# Patient Record
Sex: FEMALE | Race: WHITE | NOT HISPANIC OR LATINO | Employment: OTHER | URBAN - NONMETROPOLITAN AREA
[De-identification: names, ages, dates, MRNs, and addresses within clinical notes are randomized per-mention and may not be internally consistent; named-entity substitution may affect disease eponyms.]

---

## 2017-01-10 ENCOUNTER — OFFICE VISIT (OUTPATIENT)
Dept: UROLOGY | Facility: CLINIC | Age: 47
End: 2017-01-10

## 2017-01-10 DIAGNOSIS — N30.10 INTERSTITIAL CYSTITIS: Primary | ICD-10-CM

## 2017-01-10 LAB
BILIRUB BLD-MCNC: NEGATIVE MG/DL
CLARITY, POC: CLEAR
COLOR UR: YELLOW
GLUCOSE UR STRIP-MCNC: NEGATIVE MG/DL
KETONES UR QL: NEGATIVE
LEUKOCYTE EST, POC: NEGATIVE
NITRITE UR-MCNC: NEGATIVE MG/ML
PH UR: 5.5 [PH] (ref 5–8)
PROT UR STRIP-MCNC: NEGATIVE MG/DL
RBC # UR STRIP: NEGATIVE /UL
SP GR UR: 1.02 (ref 1–1.03)
UROBILINOGEN UR QL: NORMAL

## 2017-01-10 PROCEDURE — 81003 URINALYSIS AUTO W/O SCOPE: CPT | Performed by: NURSE PRACTITIONER

## 2017-01-10 PROCEDURE — 51700 IRRIGATION OF BLADDER: CPT | Performed by: NURSE PRACTITIONER

## 2017-01-10 NOTE — MR AVS SNAPSHOT
Sangeeta Aguilar   1/10/2017 1:20 PM   Office Visit    Dept Phone:  [phone]   Encounter #:  [id number]    Provider:  YURIY Hodge   Department:  [facility] UROLOGY                Your Full Care Plan              Your Updated Medication List          This list is accurate as of: 1/10/17  2:51 PM.  Always use your most recent med list.                atenolol 25 MG tablet   Commonly known as:  TENORMIN       busPIRone 30 MG tablet   Commonly known as:  BUSPAR   Take 1 tablet by mouth 2 (Two) Times a Day.       gabapentin 600 MG tablet   Commonly known as:  NEURONTIN       HYDROcodone-acetaminophen  MG per tablet   Commonly known as:  NORCO       levocetirizine 5 MG tablet   Commonly known as:  XYZAL       methotrexate 2.5 MG tablet       omeprazole 40 MG capsule   Commonly known as:  priLOSEC       raNITIdine 300 MG tablet   Commonly known as:  ZANTAC       venlafaxine  MG 24 hr capsule   Commonly known as:  EFFEXOR XR   Take 2 capsules by mouth daily               We Performed the Following     POC Urinalysis Dipstick, Automated       You Were Diagnosed With        Codes Comments    Interstitial cystitis    -  Primary ICD-10-CM: N30.10  ICD-9-CM: 595.1       Instructions     None    Patient Instructions History      Upcoming Appointments     Visit Type Date Time Department    PSYCHOTHERAPY 1/10/2017  2:00 PM [facility]    FOLLOW UP 1/10/2017  1:20 PM [facility]    PSYCHOTHERAPY 1/24/2017  2:00 PM [facility]    PSYCHOTHERAPY 2/7/2017  2:00 PM [facility]    FOLLOW UP 2/10/2017  1:00 PM [facility]    PSYCHOTHERAPY 2/21/2017  2:00 PM [facility]    PSYCHOTHERAPY 3/7/2017  2:00 PM [facility]    PSYCHOTHERAPY 3/21/2017  2:00 PM [facility]    MEDICINE CHECK 4/5/2017  3:00 PM [facility]      MyChart Signup     [facility] MyChart allows you to send messages to your doctor, view your test results,  renew your prescriptions, schedule appointments, and more. To sign up, go to [email or web address] and click on the Sign Up Now link in the New User? box. Enter your [employer] Activation Code exactly as it appears below along with the last four digits of your Social Security Number and your Date of Birth () to complete the sign-up process. If you do not sign up before the expiration date, you must request a new code.    [employer] Activation Code: [email or web address]  Expires: 2017  3:41 PM    If you have questions, you can email [email or web address] or call [phone] to talk to our [employer] staff. Remember, [employer] is NOT to be used for urgent needs. For medical emergencies, dial 911.               Other Info from Your Visit           Your Appointments     2017  2:00 PM EST   Psychotherapy with Jovany Ellis [facility] BEHAVIORAL HEALTH (--)    [address]  [place] [address]   [phone]            2017  2:00 PM EST   Psychotherapy with Jovany Ellis [facility] BEHAVIORAL HEALTH (--)    [address]  [place] [address]   [phone]            Feb 10, 2017  1:00 PM EST   Follow Up with YURIY Hodge   [facility] UROLOGY (--)    [address]  [place] [address] [phone]           Arrive 15 minutes prior to appointment.            2017  2:00 PM EST   [facility] BE[facility]AL [facility] (--)    [address]  [place] [address]   [phone]            Mar 07, 2017  2:00 PM EST   Psychotherapy with Jovany Ellis [facility] BEHAVIORAL HEALTH (--)    [address]  [place] [address]   [phone]              Allergies     Aspirin      Biaxin [Clarithromycin]      Celexa [Citalopram]      Ciprofloxacin      Flexeril [Cyclobenzaprine]      Lexapro [Escitalopram]      Morphine And Related      Motrin [Ibuprofen]       Neuromuscular Blocking Agents      Penicillins      Prozac [Fluoxetine Hcl]      Sulfa Antibiotics      Thorazine [Chlorpromazine]      Unasyn [Ampicillin-sulbactam Sodium]      Zoloft [Sertraline Hcl]        Reason for Visit     Cystitis           Vital Signs     Smoking Status                   Never Smoker           Problems and Diagnoses Noted     Interstitial cystitis    -  Primary      Results     POC Urinalysis Dipstick, Automated      Component Value Standard Range & Units    Color Yellow Yellow, Straw, Dark Yellow, Luiza    Clarity, UA Clear Clear    Glucose, UA Negative Negative, 1000 mg/dL (3+) mg/dL    Bilirubin Negative Negative    Ketones, UA Negative Negative    Specific Gravity  1.020 1.005 - 1.030    Blood, UA Negative Negative    pH, Urine 5.5 5.0 - 8.0    Protein, POC Negative Negative mg/dL    Urobilinogen, UA Normal Normal    Leukocytes Negative Negative    Nitrite, UA Negative Negative

## 2017-01-10 NOTE — PROGRESS NOTES
Chief Complaint:          Chief Complaint   Patient presents with   • Cystitis       HPI:   46 y.o. female being seen today for history of interstitial cystitis and for bladder instillation.    HPI        Past Medical History:        Past Medical History   Diagnosis Date   • Acid reflux    • Anxiety    • Depression    • Fibromyalgia    • Panic attack    • Rapid heart beat          Current Meds:     Current Outpatient Prescriptions   Medication Sig Dispense Refill   • atenolol (TENORMIN) 25 MG tablet      • busPIRone (BUSPAR) 30 MG tablet Take 1 tablet by mouth 2 (Two) Times a Day. 60 tablet 2   • gabapentin (NEURONTIN) 600 MG tablet      • HYDROcodone-acetaminophen (NORCO)  MG per tablet      • levocetirizine (XYZAL) 5 MG tablet      • methotrexate 2.5 MG tablet      • omeprazole (priLOSEC) 40 MG capsule      • raNITIdine (ZANTAC) 300 MG tablet      • venlafaxine XR (EFFEXOR XR) 150 MG 24 hr capsule Take 2 capsules by mouth daily 60 capsule 2     No current facility-administered medications for this visit.         Allergies:      Allergies   Allergen Reactions   • Aspirin    • Biaxin [Clarithromycin]    • Celexa [Citalopram]    • Ciprofloxacin    • Flexeril [Cyclobenzaprine]    • Lexapro [Escitalopram]    • Morphine And Related    • Motrin [Ibuprofen]    • Neuromuscular Blocking Agents    • Penicillins    • Prozac [Fluoxetine Hcl]    • Sulfa Antibiotics    • Thorazine [Chlorpromazine]    • Unasyn [Ampicillin-Sulbactam Sodium]    • Zoloft [Sertraline Hcl]         Past Surgical History:     Past Surgical History   Procedure Laterality Date   • Tubal abdominal ligation     • Cholecystectomy           Social History:     Social History     Social History   • Marital status: Single     Spouse name: N/A   • Number of children: N/A   • Years of education: N/A     Occupational History   • Not on file.     Social History Main Topics   • Smoking status: Never Smoker   • Smokeless tobacco: Never Used   • Alcohol use No    • Drug use: No   • Sexual activity: Not on file     Other Topics Concern   • Not on file     Social History Narrative   • No narrative on file       Family History:     Family History   Problem Relation Age of Onset   • Heart disease Father        Review of Systems:     Review of Systems   Constitutional: Negative for chills, fatigue and fever.   Respiratory: Negative for cough, shortness of breath and wheezing.    Cardiovascular: Negative for leg swelling.   Gastrointestinal: Negative for abdominal pain, nausea and vomiting.   Musculoskeletal: Negative for back pain and joint swelling.   Neurological: Negative for dizziness and headaches.   Psychiatric/Behavioral: Negative for confusion.       Physical Exam:     Physical Exam   Genitourinary: Vagina normal and uterus normal.   Psychiatric: She has a normal mood and affect. Her behavior is normal. Judgment and thought content normal.       Procedure:     No notes on file      Assessment:     Encounter Diagnosis   Name Primary?   • Cystitis Yes     Orders Placed This Encounter   Procedures   • POC Urinalysis Dipstick, Automated       Plan:   sindy was given a bladder instillation of lidocaine 2%-10 mL and heparin 40,000 units with sterile procedure.  She is to return to the office in one month for her next bladder instillation.    Counseling was given to patient for the following topics diagnostic results, patient and family education, impressions and risks and benefits of treatment options. and the interim medical history and current results were reviewed.  A treatment plan with follow-up was made. Total time of the encounter was 5 minutes and 5 minutes were spent discussing Cystitis [N30.90] face-to-face.       This document has been electronically signed by YURIY Garcia January 10, 2017 2:40 PM

## 2017-01-24 ENCOUNTER — OFFICE VISIT (OUTPATIENT)
Dept: PSYCHIATRY | Facility: CLINIC | Age: 47
End: 2017-01-24

## 2017-01-24 DIAGNOSIS — F33.2 SEVERE EPISODE OF RECURRENT MAJOR DEPRESSIVE DISORDER, WITHOUT PSYCHOTIC FEATURES (HCC): Primary | ICD-10-CM

## 2017-01-24 DIAGNOSIS — F41.1 GENERALIZED ANXIETY DISORDER: ICD-10-CM

## 2017-01-24 PROCEDURE — 90832 PSYTX W PT 30 MINUTES: CPT | Performed by: SOCIAL WORKER

## 2017-01-24 NOTE — PROGRESS NOTES
[place], [place] : 1970  MULTIDISCIPLINARY PROGRESS NOTE    Date of Service: 2017  Time In: 2:00 PM        Time Out: 2:30 PM    DATA: Patient met 1:1 with Jovany Ellis LCSW, LCADC  for scheduled individual outpatient psychotherapy session at the [facility] for follow-up.  Patient reports she continues to live in the home with her son, her son's partner, and his mother.  She states she likes be in there and reports the homeowner has told her on multiple occasions she never wants her to leave.  Patient reports she continues to provide care for her grandchildren from time to time and states her son continues to be unemployed and continues in a Suboxone clinic in [place].  Patient reports she has experienced increased pain due to her arthritis and states she recently was forced to discontinue methotrexate due to it causing severe sores on her skin.  As a result, she reports decreased sleep and appetite.  Patient reports she continues to worry about her son and daughter-in-law's ability to appropriately care for her grandchildren as she feels they should.    HPI: Patient reports ongoing symptoms including sad mood, periodic feelings of hopelessness, anergia, excessive worry, periodic insomnia, social isolation, and anhedonia.  She rates current symptoms at a 5 on a scale of 1-10 with 10 being the most severe.  She also reports she continues to struggle with symptoms including anxious mood, feeling on edge, tightness in her chest, insomnia, increased psychomotor activity, feeling overwhelmed, and a periodic sense of impending doom.  She rates current symptoms at a 4/5  on a scale of 1-10 with 10 being the most severe.  She reports she continues to adhere to medication regimen as prescribed with no side effects. Patient adamantly and convincingly denies suicidal or homicidal ideation or perceptual disturbance.  Patient vehemently denies any substance use.     CLNICAL MANEUVERING/INTERVENTION: Assisted  patient in processing above session content; acknowledged and normalized patient’s thoughts, feelings, and concerns.  Allowed the patient to verbalize her concerns concerning her grandchildren and validated her feelings.  However, challenged her to consider her son and daughter-in-law are adults and she cannot control their behavior nor take on responsibility for it.  Validated the patient's belief her increased pain levels have had a negative impact on her symptoms and encouraged her to continue to seek appropriate treatment and follow recommendations.  Also assisted the patient in identifying strategy to increase distress tolerance including meditation, listening to music, taking a warm bath, and finding enjoyable thing she can engage in on a regular basis and look forward to.  Provided unconditional positive regard in a safe, supportive environment.    Allowed patient to discuss issues without interruption or judgment. Provided safe, confidential environment and facilitate the development and maintenance of positive therapeutic relationship. Assisted patient in identifying increased risk factors which would indicate the need for higher level of care including thoughts to harm self or others and/or self-harming behavior and encouraged patient to contact this office, call 911, or present to the nearest emergency room should either event occur. Discussed crisis intervention services and means to access.      ASSESSMENT: Patient presents for session on time, clean and casually dressed with depressed mood and sad affect.  Patient is oriented to person, place, and time. Associations are intact, abstraction is intact. No evidence of intoxication, withdrawal, or perceptual disturbance. Attention and concentration fair, insight and judgment fair. Thought process is linear and logical.  Thought content normal.  Speech is clear and coherent.  Memory appears to be intact.  Patient reports no current suicidal or homicidal  ideation. Patient appears cooperative and agreeable to treatment and continues to maintain positive therapeutic alliance.  The patient continues to struggle with mood instability with depression being primary which likely continues to be exacerbated by ongoing medical issues and increased pain.  The patient's symptoms also appear to be exacerbated by her belief that her grandchildren are not being raised to her standards and her feeling it is her responsibility to intervene.  The patient can be reasonably expected to continue to benefit from treatment and would likely be at increased risk for decompensation otherwise.    PLAN: Patient will continue in individual outpatient psychotherapy sessions every 2-3 weeks and pharmacotherapy as scheduled the [facility].  The patient will continue to seek treatment with her primary care provider and other providers for her ongoing medical issues and will follow recommendations.  The patient will adhere to medication regimen as prescribed and report any side effects.  The patient will contact this office, call 911, or present at the nearest emergency room should suicidal or homicidal ideation occur.    Jovany Ellis LCSW, [facility]

## 2017-01-24 NOTE — MR AVS SNAPSHOT
Sangeeta Aguilar   1/24/2017 2:00 PM   Office Visit    Dept Phone:  [phone]   Encounter #:  [id number]    Provider:  Jovany Ellis LCSW   Department:  [facility] BEHAVIORAL HE[facility]                Your Full Care Plan              Your Updated Medication List          This list is accurate as of: 1/24/17  3:48 PM.  Always use your most recent med list.                atenolol 25 MG tablet   Commonly known as:  TENORMIN       busPIRone 30 MG tablet   Commonly known as:  BUSPAR   Take 1 tablet by mouth 2 (Two) Times a Day.       gabapentin 600 MG tablet   Commonly known as:  NEURONTIN       HYDROcodone-acetaminophen  MG per tablet   Commonly known as:  NORCO       levocetirizine 5 MG tablet   Commonly known as:  XYZAL       methotrexate 2.5 MG tablet       omeprazole 40 MG capsule   Commonly known as:  priLOSEC       raNITIdine 300 MG tablet   Commonly known as:  ZANTAC       venlafaxine  MG 24 hr capsule   Commonly known as:  EFFEXOR XR   Take 2 capsules by mouth daily               You Were Diagnosed With        Codes Comments    Severe episode of recurrent major depressive disorder, without psychotic features    -  Primary ICD-10-CM: F33.2  ICD-9-CM: 296.33     Generalized anxiety disorder     ICD-10-CM: F41.1  ICD-9-CM: 300.02       Instructions     None    Patient Instructions History      Upcoming Appointments     Visit Type Date Time Department    PSYCHOTHERAPY 1/24/2017  2:00 PM [facility]    PSYCHOTHERAPY 2/7/2017  2:00 PM [facility]    FOLLOW UP 2/10/2017  1:00 PM [facility]    PSYCHOTHERAPY 2/21/2017  2:00 PM [facility]    PSYCHOTHERAPY 3/7/2017  2:00 PM [facility]    PSYCHOTHERAPY 3/21/2017  2:00 PM [facility]    MEDICINE CHECK 4/5/2017  3:00 PM [facility]      MyChart Signup     [facility] allows you to send messages to your doctor, view your test results, renew your prescriptions, schedule  appointments, and more. To sign up, go to [employer] and click on the Sign Up Now link in the New User? box. Enter your [employer] Activation Code exactly as it appears below along with the last four digits of your Social Security Number and your Date of Birth () to complete the sign-up process. If you do not sign up before the expiration date, you must request a new code.    [employer] Activation Code: [email or web address]  Expires: 2017  3:48 PM    If you have questions, you can email [email or web address] or call [phone] to talk to our [employer] staff. Remember, [employer] is NOT to be used for urgent needs. For medical emergencies, dial 911.               Other Info from Your Visit           Your Appointments     2017  2:00 PM EST   Psychotherapy with Jovany Ellis [facility] BEHAVIORAL HEALTH (--)    [address]  [place] [address]   [phone]            Feb 10, 2017  1:00 PM EST   Follow Up with YURIY Hodge   [facility] UROLOGY (--)    [address] [place] [address] [phone]           Arrive 15 minutes prior to appointment.            2017  2:00 PM EST   Psychotherapy with Jovany Ellis [facility] BEHAVIORAL HEALTH (--)    [address]  [place] [address]   [phone]            Mar 07, 2017  2:00 PM EST   Psychotherapy with Jovany Ellis [facility] BEHAVIORAL HEALTH (--)    [address]  [place] [address]   [phone]            Mar 21, 2017  2:00 PM EDT   Psychotherapy with Jovany Ellis [facility] BEHAVIORAL HEALTH (--)    [address]  [place] [address]   [phone]              Allergies     Aspirin      Biaxin [Clarithromycin]      Celexa [Citalopram]      Ciprofloxacin      Flexeril [Cyclobenzaprine]      Lexapro [Escitalopram]      Morphine And Related      Motrin [Ibuprofen]      Neuromuscular Blocking Agents       Penicillins      Prozac [Fluoxetine Hcl]      Sulfa Antibiotics      Thorazine [Chlorpromazine]      Unasyn [Ampicillin-sulbactam Sodium]      Zoloft [Sertraline Hcl]        Vital Signs     Smoking Status                   Never Smoker           Problems and Diagnoses Noted     Mood problem    -  Primary    Generalized anxiety disorder

## 2017-02-07 ENCOUNTER — OFFICE VISIT (OUTPATIENT)
Dept: PSYCHIATRY | Facility: CLINIC | Age: 47
End: 2017-02-07

## 2017-02-07 DIAGNOSIS — F33.2 SEVERE EPISODE OF RECURRENT MAJOR DEPRESSIVE DISORDER, WITHOUT PSYCHOTIC FEATURES (HCC): Primary | ICD-10-CM

## 2017-02-07 DIAGNOSIS — F41.1 GENERALIZED ANXIETY DISORDER: ICD-10-CM

## 2017-02-07 PROCEDURE — 90832 PSYTX W PT 30 MINUTES: CPT | Performed by: SOCIAL WORKER

## 2017-02-08 NOTE — PROGRESS NOTES
"Sangeeta Aguilar : 1970  MULTIDISCIPLINARY PROGRESS NOTE    Date of Service: 2017   Time In: 2:32 PM        Time Out: 3:00 PM    DATA: Patient met 1:1 with Jovany Ellis LCSW, LCADC  for scheduled individual outpatient psychotherapy session at the [facility] for follow-up.  Patient reports she hasn't been feeling well as of late and states she feels she might have a cold.  She also reports she is taking new medication for rheumatoid arthritis and states she suspects it has caused an increase in her depression.  She reports her grandson recently broke his foot and states she continues to struggle with her son and daughter-in-law concerning the care of her grandchildren.  Patient also reports her estranged 's aunt, who she is very close to, is currently hospitalized terminally ill.  She states she has visited her wants but isn't sure she wants to see her in that condition.  Patient reports she continues to live in a friend's home with her adult son.      HPI: Patient reports moderately increased symptoms including sad mood, periodic feelings of hopelessness, anergia, excessive worry, periods of hypersomnia,, social isolation, and anhedonia.  Patient describes her mood as \"not good\".  She rates current symptoms at a 7/8 on a scale of 1-10 with 10 being the most severe.  She also reports she continues to struggle with symptoms including anxious mood, feeling on edge, tightness in her chest, insomnia, increased psychomotor activity, feeling overwhelmed, and a periodic sense of impending doom.  She rates current symptoms at a 4 on a scale of 1-10 with 10 being the most severe.  She reports she continues to adhere to medication regimen as prescribed with no side effects. Patient adamantly and convincingly denies suicidal or homicidal ideation or perceptual disturbance.  Patient vehemently denies any substance use.     CLNICAL MANEUVERING/INTERVENTION: Assisted patient in processing above session " content; acknowledged and normalized patient’s thoughts, feelings, and concerns.  Discussed the mutually self-perpetuating nature of social isolation and the patient's symptoms and challenged her to find activities she can engage in regular basis and to resist rationalizing avoiding leaving the house.  Also discussed healthy sleep hygiene including regular bedtimes, regular wake times, avoidance of excessive amounts of caffeine, and creating a calm, comfortable sleep environment.  Allowed the patient to discuss/vent her feelings and frustrations concerning her thoughts that her son and daughter-in-law do not care for her grandchildren as they should and validated her feelings.  However, encouraged her to remind herself she has little to no control over the situation unless the environment reaches the level of neglect or abuse at which time she should report it to the appropriate authorities.  Provided unconditional positive regard in a safe, supportive environment.    Allowed patient to discuss issues without interruption or judgment. Provided safe, confidential environment and facilitate the development and maintenance of positive therapeutic relationship. Assisted patient in identifying increased risk factors which would indicate the need for higher level of care including thoughts to harm self or others and/or self-harming behavior and encouraged patient to contact this office, call 911, or present to the nearest emergency room should either event occur. Discussed crisis intervention services and means to access.      ASSESSMENT: Patient presents for session on time, clean and casually dressed with depressed mood and sad affect.  Patient is oriented to person, place, and time. Associations are intact, abstraction is intact. No evidence of intoxication, withdrawal, or perceptual disturbance. Attention and concentration fair, insight and judgment fair. Thought process is linear and logical.  Thought content normal.   Speech is clear and coherent.  Memory appears to be intact.  Patient reports no current suicidal or homicidal ideation. Patient appears cooperative and agreeable to treatment and continues to maintain positive therapeutic alliance.  Patient appears to be struggling with moderately increased symptoms of depression which could possibly be exacerbated by a new medication to treat rheumatoid arthritis which she states she was told could possibly increase symptoms of depression.  Patient also continues to struggle with her feeling that her grandchildren are not being raised to her standards which causes her a great deal of stress and worry.  As a result, the patient would likely be at increased risk for decompensation without continued treatment.    PLAN: Patient will continue in individual outpatient psychotherapy sessions every 2-3 weeks and pharmacotherapy as scheduled the [facility].  The patient will continue to seek treatment with her primary care provider and other providers for her ongoing medical issues and will follow recommendations.  The patient will adhere to medication regimen as prescribed and report any side effects.  The patient will contact this office, call 911, or present at the nearest emergency room should suicidal or homicidal ideation occur.    Jovany Ellis, KANDI, JORDANDC

## 2017-02-10 ENCOUNTER — OFFICE VISIT (OUTPATIENT)
Dept: UROLOGY | Facility: CLINIC | Age: 47
End: 2017-02-10

## 2017-02-10 VITALS — SYSTOLIC BLOOD PRESSURE: 135 MMHG | DIASTOLIC BLOOD PRESSURE: 88 MMHG | HEART RATE: 75 BPM

## 2017-02-10 DIAGNOSIS — N30.10 INTERSTITIAL CYSTITIS: Primary | ICD-10-CM

## 2017-02-10 LAB
BILIRUB BLD-MCNC: NEGATIVE MG/DL
CLARITY, POC: CLEAR
COLOR UR: YELLOW
GLUCOSE UR STRIP-MCNC: NEGATIVE MG/DL
KETONES UR QL: NEGATIVE
LEUKOCYTE EST, POC: NEGATIVE
NITRITE UR-MCNC: NEGATIVE MG/ML
PH UR: 7 [PH] (ref 5–8)
PROT UR STRIP-MCNC: NEGATIVE MG/DL
RBC # UR STRIP: NEGATIVE /UL
SP GR UR: 1.01 (ref 1–1.03)
UROBILINOGEN UR QL: NORMAL

## 2017-02-10 PROCEDURE — 51700 IRRIGATION OF BLADDER: CPT | Performed by: NURSE PRACTITIONER

## 2017-02-10 PROCEDURE — 81003 URINALYSIS AUTO W/O SCOPE: CPT | Performed by: NURSE PRACTITIONER

## 2017-02-10 PROCEDURE — 99213 OFFICE O/P EST LOW 20 MIN: CPT | Performed by: NURSE PRACTITIONER

## 2017-02-10 NOTE — PROGRESS NOTES
Chief Complaint:          Chief Complaint   Patient presents with   • Cystitis     F/U       HPI:   46 y.o. female presenting for history of interstitial cystitis.  Had a bladder instillation 1 month ago and states it lasted approximately 3 weeks prior to return of symptoms. Wanting a bladder instillation. UA today is negative.    HPI        Past Medical History:        Past Medical History   Diagnosis Date   • Acid reflux    • Anxiety    • Depression    • Fibromyalgia    • Panic attack    • Rapid heart beat          Current Meds:     Current Outpatient Prescriptions   Medication Sig Dispense Refill   • atenolol (TENORMIN) 25 MG tablet      • busPIRone (BUSPAR) 30 MG tablet Take 1 tablet by mouth 2 (Two) Times a Day. 60 tablet 2   • gabapentin (NEURONTIN) 600 MG tablet      • HYDROcodone-acetaminophen (NORCO)  MG per tablet      • levocetirizine (XYZAL) 5 MG tablet      • methotrexate 2.5 MG tablet      • omeprazole (priLOSEC) 40 MG capsule      • raNITIdine (ZANTAC) 300 MG tablet      • venlafaxine XR (EFFEXOR XR) 150 MG 24 hr capsule Take 2 capsules by mouth daily 60 capsule 2     No current facility-administered medications for this visit.         Allergies:      Allergies   Allergen Reactions   • Aspirin    • Biaxin [Clarithromycin]    • Celexa [Citalopram]    • Ciprofloxacin    • Flexeril [Cyclobenzaprine]    • Lexapro [Escitalopram]    • Morphine And Related    • Motrin [Ibuprofen]    • Neuromuscular Blocking Agents    • Penicillins    • Prozac [Fluoxetine Hcl]    • Sulfa Antibiotics    • Thorazine [Chlorpromazine]    • Unasyn [Ampicillin-Sulbactam Sodium]    • Zoloft [Sertraline Hcl]         Past Surgical History:     Past Surgical History   Procedure Laterality Date   • Tubal abdominal ligation     • Cholecystectomy           Social History:     Social History     Social History   • Marital status: Single     Spouse name: N/A   • Number of children: N/A   • Years of education: N/A     Occupational  History   • Not on file.     Social History Main Topics   • Smoking status: Never Smoker   • Smokeless tobacco: Never Used   • Alcohol use No   • Drug use: No   • Sexual activity: Not on file     Other Topics Concern   • Not on file     Social History Narrative       Family History:     Family History   Problem Relation Age of Onset   • Heart disease Father        Review of Systems:     Review of Systems   All other systems reviewed and are negative.      Physical Exam:     Physical Exam   Constitutional: She is oriented to person, place, and time. She appears well-developed and well-nourished. No distress.   Abdominal: Soft. Bowel sounds are normal. She exhibits no distension and no mass. There is no tenderness. There is no rebound and no guarding. No hernia.   Genitourinary: Vagina normal and uterus normal.   Neurological: She is alert and oriented to person, place, and time.   Skin: Skin is warm and dry. No rash noted. She is not diaphoretic. No erythema. No pallor.   Psychiatric: She has a normal mood and affect. Her behavior is normal. Judgment and thought content normal.   Nursing note and vitals reviewed.      Procedure:     No notes on file      Assessment:     Encounter Diagnosis   Name Primary?   • Interstitial cystitis Yes     Orders Placed This Encounter   Procedures   • POC Urinalysis Dipstick, Automated       Plan:   Bladder instillation of Lidocaine 2 %  - 10 mL and Heparin 40,000 units instilled into her bladder without difficulty.  She is to return to the office in 1 month for her next bladder instillation.    Counseling was given to patient for the following topics impressions and risks and benefits of treatment options. and the interim medical history and current results were reviewed.  A treatment plan with follow-up was made. Total time of the encounter was 15 minutes and 15 minutes were spent discussing Interstitial cystitis [N30.10] face-to-face.       This document has been electronically  signed by YURIY Garcia February 10, 2017 2:15 PM

## 2017-02-17 RX ORDER — HEPARIN SODIUM 20000 [USP'U]/ML
40000 INJECTION INTRAVENOUS; SUBCUTANEOUS ONCE
Status: COMPLETED | OUTPATIENT
Start: 2017-02-10 | End: 2017-02-17

## 2017-02-17 RX ADMIN — HEPARIN SODIUM 40000 UNITS: 20000 INJECTION INTRAVENOUS; SUBCUTANEOUS at 11:00

## 2017-03-21 ENCOUNTER — OFFICE VISIT (OUTPATIENT)
Dept: UROLOGY | Facility: CLINIC | Age: 47
End: 2017-03-21

## 2017-03-21 ENCOUNTER — OFFICE VISIT (OUTPATIENT)
Dept: PSYCHIATRY | Facility: CLINIC | Age: 47
End: 2017-03-21

## 2017-03-21 DIAGNOSIS — F41.1 GENERALIZED ANXIETY DISORDER: ICD-10-CM

## 2017-03-21 DIAGNOSIS — N30.10 INTERSTITIAL CYSTITIS: Primary | ICD-10-CM

## 2017-03-21 DIAGNOSIS — F33.2 SEVERE EPISODE OF RECURRENT MAJOR DEPRESSIVE DISORDER, WITHOUT PSYCHOTIC FEATURES (HCC): Primary | ICD-10-CM

## 2017-03-21 LAB
BILIRUB BLD-MCNC: NEGATIVE MG/DL
CLARITY, POC: CLEAR
COLOR UR: ABNORMAL
GLUCOSE UR STRIP-MCNC: NEGATIVE MG/DL
KETONES UR QL: NEGATIVE
LEUKOCYTE EST, POC: ABNORMAL
NITRITE UR-MCNC: NEGATIVE MG/ML
PH UR: 5.5 [PH] (ref 5–8)
PROT UR STRIP-MCNC: ABNORMAL MG/DL
RBC # UR STRIP: NEGATIVE /UL
SP GR UR: 1.03 (ref 1–1.03)
UROBILINOGEN UR QL: NORMAL

## 2017-03-21 PROCEDURE — 51700 IRRIGATION OF BLADDER: CPT | Performed by: NURSE PRACTITIONER

## 2017-03-21 PROCEDURE — 81003 URINALYSIS AUTO W/O SCOPE: CPT | Performed by: NURSE PRACTITIONER

## 2017-03-21 PROCEDURE — 99213 OFFICE O/P EST LOW 20 MIN: CPT | Performed by: NURSE PRACTITIONER

## 2017-03-21 PROCEDURE — 90834 PSYTX W PT 45 MINUTES: CPT | Performed by: SOCIAL WORKER

## 2017-03-21 NOTE — PROGRESS NOTES
Sangeeta Aguilar : 1970  MULTIDISCIPLINARY PROGRESS NOTE    Date of Service: 3/21/2017  Time In: 2:05 PM       Time Out: 2:42 PM    DATA: Patient met 1:1 with Jovany Ellis LCSW, LCADC  for scheduled individual outpatient psychotherapy session at the [facility] [address] for follow-up.  Patient reports she missed some previous appointments due to not feeling well and having her urinary tract infection.  She reports she is struggling with ongoing difficulties with her kidneys and bladder and states she has an appointment with her kidney doctor immediately following this session.  Patient states she continues to live communally with her son, his ex-boyfriend, and his ex-boyfriends mother.  The patient reports she and her son are considering seeking an apartment together.  Patient reports she continues to struggle with her youngest son who she believes to be an active addiction due to her fear he will not care for HER-2 grandchildren appropriately.  The patient states she routinely gets into arguments with him over his behavior.  Patient also reports she is planning a trip to [place] to see her oldest daughter within the next week.    HPI: Patient continues to struggle with depressed mood, feelings of hopelessness, anergia, obsessive worry, social isolation, and anhedonia.  She rates current symptoms at a 6 on a scale of 1-10 with 10 being the most severe.  She also continues to struggle with  anxious mood, feeling on edge, muscle tension, tightness in her chest, increased psychomotor activity, feeling overwhelmed, and a periodic sense of impending doom.  She rates current symptoms at a 5 on a scale of 1-10 with 10 being the most severe.  She reports she continues to adhere to medication regimen as prescribed with no side effects. Patient adamantly and convincingly denies suicidal or homicidal ideation or perceptual disturbance.  Patient vehemently denies any substance use.     CLNICAL MANEUVERING/INTERVENTION:  Assisted patient in processing above session content; acknowledged and normalized patient’s thoughts, feelings, and concerns.  Allowed the patient to discuss/vent her ongoing frustrations with her youngest son's behavior and validated her feelings.  However, encouraged her to consider getting into verbal altercations will likely only serve to strengthen his addiction and encouraged her to avoid this behavior.  Also discussed appropriate boundaries in a parent/adult child relationship and encouraged her to continue to work on accepting the fact she has limited control over the situation.  Also advised the patient to contact child services if she feels her grandchildren are in danger or are being neglected.  Assisted the patient in identifying verbalizing coping skills to decrease the severity and frequency of symptoms including relaxation techniques, and finding enjoyable activities she can engage in a regular basis to decrease social isolation, and focusing on healthy skills of daily living.  Provided unconditional positive regard in a safe, supportive environment.    Allowed patient to discuss issues without interruption or judgment. Provided safe, confidential environment and facilitate the development and maintenance of positive therapeutic relationship. Assisted patient in identifying increased risk factors which would indicate the need for higher level of care including thoughts to harm self or others and/or self-harming behavior and encouraged patient to contact this office, call 911, or present to the nearest emergency room should either event occur. Discussed crisis intervention services and means to access.      ASSESSMENT: Patient presents for session on time, clean and casually dressed with depressed mood and sad affect.  Patient is oriented to person, place, and time. Associations are intact, abstraction is intact. No evidence of intoxication, withdrawal, or perceptual disturbance.  No psychomotor  agitation/retardation.  Attention and concentration fair, insight and judgment fair. Thought process is linear and logical.  Thought content normal.  Speech is clear and coherent.  Memory appears to be intact.  Patient reports no current suicidal or homicidal ideation. Patient appears cooperative and agreeable to treatment and continues to maintain positive therapeutic alliance.  The patient continues to struggle with mood instability with depression being primary which likely continues to be exacerbated by ongoing medical issues and increased pain along with ongoing strain with her relationship with her youngest son as it relates to her grandchildren.  The patient can be reasonably expected to continue to benefit from treatment and would likely be at increased risk for decompensation otherwise.    PLAN: Patient will continue in individual outpatient psychotherapy sessions every 2-3 weeks and pharmacotherapy as scheduled the [facility].  The patient will continue to seek treatment with her primary care provider and other providers for her ongoing medical issues and will follow recommendations.  The patient will adhere to medication regimen as prescribed and report any side effects.  The patient will contact this office, call 911, or present at the nearest emergency room should suicidal or homicidal ideation occur.    Jovany Ellis LCSW, [facility]

## 2017-03-21 NOTE — PROGRESS NOTES
Chief Complaint:          Chief Complaint   Patient presents with   • Cystitis       HPI:   46 y.o. female being seen in the office today for interstitial cystitis that is treated with bladder instillations. Patient does states she was diagnosed with a urinary tract infection by her primary care physician 2 weeks ago and was started on Keflex which did seem to help her symptoms some.  States he urine culture was not completed.  Her urinalysis today did reveal a trace amount of leukocytes so a cath urine was obtained to rule out possible contamination of urine revealing negative results.    HPI        Past Medical History:        Past Medical History   Diagnosis Date   • Acid reflux    • Anxiety    • Depression    • Fibromyalgia    • Panic attack    • Rapid heart beat          Current Meds:     Current Outpatient Prescriptions   Medication Sig Dispense Refill   • atenolol (TENORMIN) 25 MG tablet      • busPIRone (BUSPAR) 30 MG tablet Take 1 tablet by mouth 2 (Two) Times a Day. 60 tablet 2   • gabapentin (NEURONTIN) 600 MG tablet      • HYDROcodone-acetaminophen (NORCO)  MG per tablet      • levocetirizine (XYZAL) 5 MG tablet      • methotrexate 2.5 MG tablet      • omeprazole (priLOSEC) 40 MG capsule      • raNITIdine (ZANTAC) 300 MG tablet      • venlafaxine XR (EFFEXOR XR) 150 MG 24 hr capsule Take 2 capsules by mouth daily 60 capsule 2     No current facility-administered medications for this visit.         Allergies:      Allergies   Allergen Reactions   • Aspirin    • Biaxin [Clarithromycin]    • Celexa [Citalopram]    • Ciprofloxacin    • Flexeril [Cyclobenzaprine]    • Lexapro [Escitalopram]    • Morphine And Related    • Motrin [Ibuprofen]    • Neuromuscular Blocking Agents    • Penicillins    • Prozac [Fluoxetine Hcl]    • Sulfa Antibiotics    • Thorazine [Chlorpromazine]    • Unasyn [Ampicillin-Sulbactam Sodium]    • Zoloft [Sertraline Hcl]         Past Surgical History:     Past Surgical History    Procedure Laterality Date   • Tubal abdominal ligation     • Cholecystectomy           Social History:     Social History     Social History   • Marital status: Single     Spouse name: N/A   • Number of children: N/A   • Years of education: N/A     Occupational History   • Not on file.     Social History Main Topics   • Smoking status: Never Smoker   • Smokeless tobacco: Never Used   • Alcohol use No   • Drug use: No   • Sexual activity: Not on file     Other Topics Concern   • Not on file     Social History Narrative       Family History:     Family History   Problem Relation Age of Onset   • Heart disease Father        Review of Systems:     Review of Systems    Physical Exam:     Physical Exam   Constitutional: She is oriented to person, place, and time. She appears well-developed and well-nourished. No distress.   Abdominal: Soft. Bowel sounds are normal. She exhibits no distension and no mass. There is no tenderness. There is no rebound and no guarding. No hernia.   Genitourinary: Vagina normal and uterus normal.   Musculoskeletal: Normal range of motion.   Neurological: She is alert and oriented to person, place, and time.   Skin: Skin is warm and dry. No rash noted. She is not diaphoretic. No erythema. No pallor.   Psychiatric: Judgment and thought content normal.   Nursing note and vitals reviewed.      Procedure:     No notes on file      Assessment:     Encounter Diagnosis   Name Primary?   • Cystitis Yes     Orders Placed This Encounter   Procedures   • POC Urinalysis Dipstick, Automated       Plan:   Bladder instillation of lidocaine 2%-10 mL and heparin 40,000 units was instilled into her bladder under sterile procedure without difficulty. She is to return to the office in one month for her next bladder instillation. Tolerates procedure well.    Counseling was given to patient for the following topics impressions. and the interim medical history and current results were reviewed.  A treatment plan  with follow-up was made. Total time of the encounter was 18 minutes and 18 minutes were spent discussing Cystitis [N30.90] face-to-face.       This document has been electronically signed by YURIY Garcia March 21, 2017 4:09 PM

## 2017-04-05 ENCOUNTER — OFFICE VISIT (OUTPATIENT)
Dept: PSYCHIATRY | Facility: CLINIC | Age: 47
End: 2017-04-05

## 2017-04-05 VITALS
SYSTOLIC BLOOD PRESSURE: 123 MMHG | BODY MASS INDEX: 23.78 KG/M2 | HEART RATE: 72 BPM | DIASTOLIC BLOOD PRESSURE: 79 MMHG | WEIGHT: 148 LBS | HEIGHT: 66 IN

## 2017-04-05 DIAGNOSIS — F41.1 GENERALIZED ANXIETY DISORDER: ICD-10-CM

## 2017-04-05 DIAGNOSIS — F33.2 SEVERE EPISODE OF RECURRENT MAJOR DEPRESSIVE DISORDER, WITHOUT PSYCHOTIC FEATURES (HCC): Primary | ICD-10-CM

## 2017-04-05 PROCEDURE — 99213 OFFICE O/P EST LOW 20 MIN: CPT | Performed by: PSYCHIATRY & NEUROLOGY

## 2017-04-05 RX ORDER — DOXEPIN HYDROCHLORIDE 25 MG/1
CAPSULE ORAL
COMMUNITY
Start: 2017-03-07 | End: 2017-04-05

## 2017-04-05 RX ORDER — HYDROXYCHLOROQUINE SULFATE 200 MG/1
200 TABLET, FILM COATED ORAL 2 TIMES DAILY
COMMUNITY
Start: 2017-03-31

## 2017-04-05 RX ORDER — TRIAMCINOLONE ACETONIDE 1 MG/G
CREAM TOPICAL
COMMUNITY
Start: 2017-03-07 | End: 2022-03-08

## 2017-04-05 RX ORDER — BETAMETHASONE DIPROPIONATE 0.5 MG/G
CREAM TOPICAL
COMMUNITY
Start: 2017-03-07 | End: 2022-03-08

## 2017-04-05 RX ORDER — VENLAFAXINE HYDROCHLORIDE 150 MG/1
CAPSULE, EXTENDED RELEASE ORAL
Qty: 60 CAPSULE | Refills: 2 | Status: SHIPPED | OUTPATIENT
Start: 2017-04-05 | End: 2017-07-20 | Stop reason: SDUPTHER

## 2017-04-05 RX ORDER — BUSPIRONE HYDROCHLORIDE 30 MG/1
30 TABLET ORAL 2 TIMES DAILY
Qty: 60 TABLET | Refills: 2 | Status: SHIPPED | OUTPATIENT
Start: 2017-04-05 | End: 2017-07-20 | Stop reason: SDUPTHER

## 2017-04-05 NOTE — PROGRESS NOTES
"  CC: f/u MDD    HX:  [place] reports that she is doing okay.  She continues to be stressed by her family situation.  She worries excessively about her son particularly the one who continues to use drugs.  She is trying to keep an eye on the children and make sure that his children are still safe.  She assures me that the children do not see him using and her daughter-in-law or her estranged  usually take care of the kids when he uses.  She denies any medication side effects.  No suicidal or homicidal thoughts.  She currently is living with her son and is considering moving to [place] with her daughter.  She does worry that if she were to move there, there is a chance her daughter may have to move within months of moving air.  At time she is feeling hopeless but finds her kids keep her going.    Appetite-poor   Energy level-fair, changes Sleep-fair    I have reviewed pt's allergies and current medications.     Review of Systems  /79  Pulse 72  Ht 66\" (167.6 cm)  Wt 148 lb (67.1 kg)  BMI 23.89 kg/m2    EXAM: Neatly, casually dressed, good hygeine.  Limited eye contact Gait and station stable. No psychomotor agitation/retardation. No motor tics Speech is normal rate, amount. Associations intact. Mood \"okay\" affect slightly dysphoric and tired appearing. TC-goal directed.  Denies SI/HI/VH/AH. TP-linear.  Attention and concentration are fair. Memory is intact for recent and remote events. Insight-fair, judgement-fair      Encounter Diagnoses   Name Primary?   • Severe episode of recurrent major depressive disorder, without psychotic features Yes   • Generalized anxiety disorder        Current Outpatient Prescriptions   Medication Sig Dispense Refill   • atenolol (TENORMIN) 25 MG tablet      • betamethasone dipropionate (DIPROLENE) 0.05 % cream      • busPIRone (BUSPAR) 30 MG tablet Take 1 tablet by mouth 2 (Two) Times a Day. 60 tablet 2   • gabapentin (NEURONTIN) 600 MG tablet      • " HYDROcodone-acetaminophen (NORCO)  MG per tablet      • hydroxychloroquine (PLAQUENIL) 200 MG tablet      • levocetirizine (XYZAL) 5 MG tablet      • omeprazole (priLOSEC) 40 MG capsule      • raNITIdine (ZANTAC) 300 MG tablet      • triamcinolone (KENALOG) 0.1 % cream      • venlafaxine XR (EFFEXOR XR) 150 MG 24 hr capsule Take 2 capsules by mouth daily 60 capsule 2     No current facility-administered medications for this visit.    Plan:  1. Continue current medications  2. Continue therapy with Jovany Ellis  3. RTC 3months          The risks, benefits, and treatment alternatives were discussed with the patient.     TIME IN:3:20P  TIME OUT:3:45P

## 2017-04-25 ENCOUNTER — OFFICE VISIT (OUTPATIENT)
Dept: PSYCHIATRY | Facility: CLINIC | Age: 47
End: 2017-04-25

## 2017-04-25 DIAGNOSIS — F33.2 SEVERE EPISODE OF RECURRENT MAJOR DEPRESSIVE DISORDER, WITHOUT PSYCHOTIC FEATURES (HCC): Primary | ICD-10-CM

## 2017-04-25 DIAGNOSIS — F41.1 GENERALIZED ANXIETY DISORDER: ICD-10-CM

## 2017-04-25 PROCEDURE — 90837 PSYTX W PT 60 MINUTES: CPT | Performed by: SOCIAL WORKER
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
## 2017-05-09 ENCOUNTER — OFFICE VISIT (OUTPATIENT)
Dept: PSYCHIATRY | Facility: CLINIC | Age: 47
End: 2017-05-09

## 2017-05-09 DIAGNOSIS — F41.1 GENERALIZED ANXIETY DISORDER: ICD-10-CM

## 2017-05-09 DIAGNOSIS — F33.2 SEVERE EPISODE OF RECURRENT MAJOR DEPRESSIVE DISORDER, WITHOUT PSYCHOTIC FEATURES (HCC): Primary | ICD-10-CM

## 2017-05-09 PROCEDURE — 90834 PSYTX W PT 45 MINUTES: CPT | Performed by: SOCIAL WORKER

## 2017-06-20 ENCOUNTER — OFFICE VISIT (OUTPATIENT)
Dept: PSYCHIATRY | Facility: CLINIC | Age: 47
End: 2017-06-20

## 2017-06-20 DIAGNOSIS — F41.1 GENERALIZED ANXIETY DISORDER: ICD-10-CM

## 2017-06-20 DIAGNOSIS — F33.2 SEVERE EPISODE OF RECURRENT MAJOR DEPRESSIVE DISORDER, WITHOUT PSYCHOTIC FEATURES (HCC): Primary | ICD-10-CM

## 2017-06-20 PROCEDURE — 90832 PSYTX W PT 30 MINUTES: CPT | Performed by: SOCIAL WORKER

## 2017-06-20 NOTE — PROGRESS NOTES
Sangeeta Aguilar : 1970  MULTIDISCIPLINARY PROGRESS NOTE    Date of Service: 2017  Time In: 2:40 PM      Time Out: 3:15 PM    DATA: Patient met 1:1 with Jovany Ellis LCSW, LCADC  for scheduled individual outpatient psychotherapy session at the [facility] for follow-up.  Patient reports things have been extremely stressful as of late and states her youngest son has been incarcerated for approximately one week after breaking in to her ex-'s home and stealing various things.  Patient reports her son continues in active addiction and states she is hopeful his time incarcerated will motivate him to seek treatment.  The patient reports a recent increase in her psoriasis which she states is likely due to increased stress levels.    HPI: Patient continues to struggle with depressed mood, feelings of hopelessness, anergia, obsessive worry, social isolation, and anhedonia.  She rates current symptoms at a 7 on a scale of 1-10 with 10 being the most severe.  She also continues to struggle with  anxious mood, feeling on edge, muscle tension, nausea, tingling sensation, increased psychomotor activity, feeling overwhelmed, and a periodic sense of impending doom.  She rates current symptoms at a 7 on a scale of 1-10 with 10 being the most severe.  She reports she continues to adhere to medication regimen as prescribed with no side effects. Patient adamantly and convincingly denies suicidal or homicidal ideation or perceptual disturbance.  Patient vehemently denies any substance use.     CLNICAL MANEUVERING/INTERVENTION: Assisted patient in processing above session content; acknowledged and normalized patient’s thoughts, feelings, and concerns.  Allowed the patient to discuss/vent her feelings and frustrations concerning her son's ongoing active addiction and validated her feelings.  Provided information/education concerning the concepts of enabling and codependency and challenged the patient to remind  herself she cannot coerce her son strongly in the to change his behavior.  Conversely, encouraged her to consider he must reach a point in his addiction where he is motivated to seek recovery in an attempt to better his life.  Also challenged the patient to begin to deconstruct her core belief it is somehow her responsibility to keep her son's life on track.  Provided unconditional positive regard in a safe, supportive environment.    Allowed patient to discuss issues without interruption or judgment. Provided safe, confidential environment and facilitate the development and maintenance of positive therapeutic relationship. Assisted patient in identifying increased risk factors which would indicate the need for higher level of care including thoughts to harm self or others and/or self-harming behavior and encouraged patient to contact this office, call 911, or present to the nearest emergency room should either event occur. Discussed crisis intervention services and means to access.      ASSESSMENT: Patient presents for session on time, clean and casually dressed with depressed/anxious mood and congruent affect.  Patient is oriented to person, place, and time. Associations are intact, abstraction is intact. No evidence of intoxication, withdrawal, or perceptual disturbance.  No psychomotor agitation/retardation.  Attention and concentration fair, insight and judgment fair. Thought process is linear and logical.  Thought content normal.  Speech is clear and coherent.  Memory appears to be intact.  Patient reports no current suicidal or homicidal ideation. Patient appears cooperative and agreeable to treatment and continues to maintain positive therapeutic alliance.  Patient continues to have negative symptoms of depression and anxiety which is likely exacerbated by ongoing family stress due to her youngest son's active addiction and recent incarceration.  As a result, the patient would likely be at risk for  decompensation without continued treatment.    PLAN: Patient will continue in individual outpatient psychotherapy sessions every 2-3 weeks and pharmacotherapy as scheduled the [facility].  The patient will continue to seek treatment with her primary care provider and other providers for her ongoing medical issues and will follow recommendations.  The patient will adhere to medication regimen as prescribed and report any side effects.  The patient will contact this office, call 911, or present at the nearest emergency room should suicidal or homicidal ideation occur.    Jovany Ellis LCSW, ZAHIDA

## 2017-07-11 ENCOUNTER — OFFICE VISIT (OUTPATIENT)
Dept: PSYCHIATRY | Facility: CLINIC | Age: 47
End: 2017-07-11

## 2017-07-11 DIAGNOSIS — F33.2 SEVERE EPISODE OF RECURRENT MAJOR DEPRESSIVE DISORDER, WITHOUT PSYCHOTIC FEATURES (HCC): Primary | ICD-10-CM

## 2017-07-11 DIAGNOSIS — F41.1 GENERALIZED ANXIETY DISORDER: ICD-10-CM

## 2017-07-11 PROCEDURE — 90834 PSYTX W PT 45 MINUTES: CPT | Performed by: SOCIAL WORKER

## 2017-07-11 NOTE — TREATMENT PLAN
Multi-Disciplinary Problems (from Behavioral Health Treatment Plan)    Active Problems     Problem: Anxiety (Priority: --)  (Start Date: 07/11/17) (Resolve Date: --)    Problem Details:  The patient self-scales this problem as a 6 with 10 being the worst.         Goal Start Date End Date    Patient will develop and implement behavioral and cognitive strategies to reduce anxiety and irrational fears. 07/11/17 --    Goal Details:  Progress toward goal:  The patient self-scales their progress related to this goal as a 5 with 10 being the worst.         Goal Intervention Frequency Start Date End Date    Help patient explore past emotional issues in relation to present anxiety. Q3 Weeks 07/11/17 07/11/17    Intervention Details:  Duration of treatment until remission of symptoms.         Goal Intervention Frequency Start Date End Date    Help patient develop an awareness of their cognitive and physical responses to anxiety. Q3 Weeks 07/11/17 07/11/17    Intervention Details:  Duration of treatment until remission of symptoms.               Problem: Depression (Priority: --)  (Start Date: 07/11/17) (Resolve Date: --)    Problem Details:  The patient self-scales this problem as a 6 with 10 being the worst.         Goal Start Date End Date    Patient will demonstrate the ability to initiate new constructive life skills outside of sessions on a consistent basis. 07/11/17 --    Goal Details:  Progress toward goal:  The patient self-scales their progress related to this goal as a 5 with 10 being the worst.         Goal Intervention Frequency Start Date End Date    Assist patient in setting attainable activities of daily living goals. Q3 Weeks 07/11/17 07/11/18    Intervention Details:  Patient will keep all appointments and follow recommendations. Patient will actively seek activities she can engage in on a regular basis. Patient will focus on healthy skills of daily living including eating a healthy diet, getting regular  physical activity, and getting adequate sleep.        Goal Intervention Frequency Start Date End Date    Provide education about depression Q3 Weeks 07/11/17 07/11/18    Intervention Details:  Duration of treatment until remission of symptoms.         Goal Intervention Frequency Start Date End Date    Assist patient in developing healthy coping strategies. Q3 Weeks 07/11/17 07/11/18    Intervention Details:  Duration of treatment until remission of symptoms.                            I have discussed and reviewed this treatment plan with the patient.  It has been printed for signatures.

## 2017-07-12 NOTE — PROGRESS NOTES
"Sangeeta Aguilar : 1970  MULTIDISCIPLINARY PROGRESS NOTE    Date of Service: 2017  Time In: 2:50 PM     Time Out: 3:30 PM    DATA: Patient met 1:1 with Jovany Ellis LCSW, LCADC  for scheduled individual outpatient psychotherapy session at the [facility] for follow-up.  Patient reports she continues to struggle with her son's ongoing addiction which has resulted in him currently being incarcerated.  She also reports she feels guilty for showing him \"tough love\".  Patient reports she continues to have a great deal of stress and worry concerning her grandchildren since her daughter-in-law is no longer working and her son is incarcerated.    HPI: Patient continues to struggle with depressed mood,  irritability, hopelessness, anergia, anhedonia, obsessive worry, and social isolation,  .  She rates current symptoms at a 5 on a scale of 1-10 with 10 being the most severe.  She also continues to struggle with  anxious mood, feeling on edge, muscle tension, shortness of breath, increased psychomotor activity, feeling overwhelmed, and a periodic sense of impending doom.  She rates current symptoms at a 5 on a scale of 1-10 with 10 being the most severe.  She reports she continues to adhere to medication regimen as prescribed with no side effects. Patient adamantly and convincingly denies suicidal or homicidal ideation or perceptual disturbance.  Patient vehemently denies any substance use.     CLNICAL MANEUVERING/INTERVENTION: Assisted patient in processing above session content; acknowledged and normalized patient’s thoughts, feelings, and concerns.  Allow the patient to discuss/vent her feelings and frustrations concerning her son's ongoing addiction and current incarceration and validated her feelings.  Also discussed in enabling and codependency and praised the patient for her willingness to go against her maternal instincts and stop enabling her son's destructive behavior.  Assisted the patient in " reviewing coping skills she has found to be effective in reducing the severity and frequency of symptoms including positive self talk, focusing on healthy skills of daily living, and finding enjoyable activities she can engage in on a regular basis to decrease idle time and social isolation.  Also assisted the patient in processing her feelings and worry concerning her grandchildren.  Provided unconditional positive regard in a safe, confidential environment.    Allowed patient to discuss issues without interruption or judgment. Provided safe, confidential environment and facilitate the development and maintenance of positive therapeutic relationship. Assisted patient in identifying increased risk factors which would indicate the need for higher level of care including thoughts to harm self or others and/or self-harming behavior and encouraged patient to contact this office, call 911, or present to the nearest emergency room should either event occur. Discussed crisis intervention services and means to access.      ASSESSMENT: Patient presents for session on time, clean and casually dressed with depressed mood and congruent affect.  Patient is oriented to person, place, and time. Associations are intact, abstraction is intact. No evidence of intoxication, withdrawal, or perceptual disturbance.  No psychomotor agitation/retardation.  Attention and concentration fair, insight and judgment fair. Thought process is linear and logical.  Thought content normal.  Speech is clear and coherent.  Memory appears to be intact.  Patient reports no current suicidal or homicidal ideation. Patient appears cooperative and agreeable to treatment and continues to maintain positive therapeutic alliance.  The patient continues to struggle with depression and anxiety which continues to be exacerbated by her son's ongoing addiction and resulting negative impact on her grandchildren.  The patient can be reasonably expected to continue to  benefit from treatment and would likely be at increased risk for decompensation otherwise.    PLAN: Patient will continue in individual outpatient psychotherapy sessions every 2-3 weeks and pharmacotherapy as scheduled the [facility].  The patient will continue to seek treatment with her primary care provider and other providers for her ongoing medical issues and will follow recommendations.  The patient will adhere to medication regimen as prescribed and report any side effects.  The patient will contact this office, call 911, or present at the nearest emergency room should suicidal or homicidal ideation occur.    Jovany Ellis LCSW, ZAHIDA

## 2017-07-21 RX ORDER — VENLAFAXINE HYDROCHLORIDE 150 MG/1
CAPSULE, EXTENDED RELEASE ORAL
Qty: 60 CAPSULE | Refills: 2 | Status: SHIPPED | OUTPATIENT
Start: 2017-07-21 | End: 2017-09-13 | Stop reason: SDUPTHER

## 2017-07-21 RX ORDER — BUSPIRONE HYDROCHLORIDE 30 MG/1
30 TABLET ORAL 2 TIMES DAILY
Qty: 60 TABLET | Refills: 2 | Status: SHIPPED | OUTPATIENT
Start: 2017-07-21 | End: 2017-09-13 | Stop reason: SDUPTHER

## 2017-08-08 ENCOUNTER — OFFICE VISIT (OUTPATIENT)
Dept: PSYCHIATRY | Facility: CLINIC | Age: 47
End: 2017-08-08

## 2017-08-08 DIAGNOSIS — F41.1 GENERALIZED ANXIETY DISORDER: ICD-10-CM

## 2017-08-08 DIAGNOSIS — F33.2 SEVERE EPISODE OF RECURRENT MAJOR DEPRESSIVE DISORDER, WITHOUT PSYCHOTIC FEATURES (HCC): Primary | ICD-10-CM

## 2017-08-08 PROCEDURE — 90832 PSYTX W PT 30 MINUTES: CPT | Performed by: SOCIAL WORKER

## 2017-08-09 NOTE — PROGRESS NOTES
PROGRESS NOTE  Data:  Sangeeta Aguilar is a 46 y.o. female who met 1:1 with Jovany Ellis LCSW,ZAHIDA for regularly scheduled psychotherapy session at the [facility] on 08/08/2017 from 3:00 PM to 3:25 PM.  Patient reports ongoing significant stress due to her son being incarcerated and attempting to assist her daughter-in-law is caring for HER-2 children.  Patient also reports she continues to have verbal altercations with her estranged  and also states she is planning to seek independent housing in a nearby town.  Patient also reports she continues to struggle with various health issues and has multiple upcoming appointments.     HPI: The patient continues to struggle with significant symptoms of depression as evidenced by depressed mood, obsessive worry, ongoing insomnia, feeling hopeless, difficulty concentrating, and ongoing social isolation.  Patient rates current symptoms of depression at 7 on a scale of 1-10 with 10 being most severe.  Patient also continues to present with anxious mood, feeling on edge, increased heart rate, muscle tension, feeling overwhelmed, and a sense of impending doom.  Patient rates current symptoms of anxiety at 6/7 on a scale of 1-10 with 10 being most severe.  Patient reports she continues to adhere to medication regimen as prescribed.  The patient adamantly and convincingly denies suicidal ideation and vehemently denies any substance use.      Clinical Maneuvering/Intervention:  Assisted patient in processing above session content; acknowledged and normalized patient’s thoughts, feelings, and concerns.  Allow the patient to discuss/vent her feelings of frustration concerning her son's ongoing incarceration and the negative impact on her family.  Validated the patient's feelings but also encouraged her to remind herself she has little to no control over the situation and cannot dictate the behaviors or decisions of her adult son.  Assisted patient in identifying coping  mechanism she can utilize to decrease the severity and frequency of symptoms of depression and anxiety including positive self talk, daily affirmations, relaxation techniques, guided imagery, and actively seeking enjoyable activities she can engage in on a regular basis to reduce idle time and social isolation.  Also encouraged the patient to keep all appointments with other providers and follow recommendations.  Provided unconditional positive regard a safe, supportive environment.    Allowed patient to freely discuss issues without interruption or judgment. Provided safe, confidential environment to facilitate the development of positive therapeutic relationship and encourage open, honest communication. Assisted patient in identifying risk factors which would indicate the need for higher level of care including thoughts to harm self or others and/or self-harming behavior and encouraged patient to contact this office, call 911, or present to the nearest emergency room should any of these events occur. Discussed crisis intervention services and means to access.  Patient adamantly and convincingly denies current suicidal or homicidal ideation or perceptual disturbance.    Assessment     Diagnoses and all orders for this visit:    Severe episode of recurrent major depressive disorder, without psychotic features    Generalized anxiety disorder               Mental Status Exam  Hygiene:  good  Dress:  casual  Attitude:  Cooperative  Motor Activity:  Slow  Speech:  Monotone  Mood:  depressed  Affect:  depressed  Thought Processes:  Linear  Thought Content:  Normal  Suicidal Thoughts:  denies  Homicidal Thoughts:  denies  Crisis Safety Plan: yes, to come to the emergency room.  Hallucinations:  denies    Patient's Support Network Includes:  son    Progress toward goal: The patient continues to rate symptoms of depression at a 7 and anxiety at 6/7 on a scale of 1-10 with 10 being most severe.  Patient continues to struggle  with significant anhedonia which results in limited social interaction and ongoing social isolation.    Functional assessment: Moderate functional impairment as evidenced by the patient's ongoing social isolation and difficulty in social and interpersonal domains.  The patient would likely be at increased risk for decompensation without ongoing treatment.    Prognosis: Fair    Plan         The patient will continue in individual outpatient psychotherapy session 3-4 weeks at the [facility] and pharmacotherapy as scheduled.  Patient will adhere to medication regimen as prescribed and report any side effects. Patient will contact this office, call 911 or present to the nearest emergency room should suicidal or homicidal ideations occur. Provide Cognitive Behavioral Therapy and Solution Focused Therapy to improve functioning, maintain stability, and avoid decompensation and the need for higher level of care.          Return in about 3 weeks (around 08/29/2017) for Next scheduled follow up.    Jovany Ellis LCSW,[facility]

## 2017-08-22 ENCOUNTER — OFFICE VISIT (OUTPATIENT)
Dept: PSYCHIATRY | Facility: CLINIC | Age: 47
End: 2017-08-22

## 2017-08-22 DIAGNOSIS — F33.2 SEVERE EPISODE OF RECURRENT MAJOR DEPRESSIVE DISORDER, WITHOUT PSYCHOTIC FEATURES (HCC): Primary | ICD-10-CM

## 2017-08-22 DIAGNOSIS — F41.1 GENERALIZED ANXIETY DISORDER: ICD-10-CM

## 2017-08-22 PROCEDURE — 90837 PSYTX W PT 60 MINUTES: CPT | Performed by: SOCIAL WORKER

## 2017-08-22 NOTE — PROGRESS NOTES
PROGRESS NOTE  Data:  Sangeeta Aguilar is a 46 y.o. female who met 1:1 with Jovany Ellis, KANDI,ZAHIDA for regularly scheduled psychotherapy session at the [facility] for follow-up of depression and anxiety on 8/22/2017 from 2:55 PM to 3:52 PM.  Patient reports she recently moved out of a friend's home into her own home near her ex-.  Patient continues to discuss distress created by her youngest son continued to be incarcerated which has resulted in difficulty caring for his children.  The patient states she continues to feel obligated to care for her grandchildren and states she plans to move again closer to where ever their mother is able to get an apartment.     HPI: Patient reports she continues to struggle with significant symptoms of depression including depressed mood, irritability, hopelessness and helplessness, frequent crying spells, continued insomnia, and ongoing social isolation.  The patient rates current symptoms of depression at an 8 on a scale 1-10 with 10 being most severe.  She also continues to struggle with anxiety as evidenced by feeling on edge, increased heart rate, feeling overwhelmed, trembling, and a sense of impending doom.  The patient rates current symptoms of anxiety at 6 on a scale 1-10 with 10 being most severe.  She reports she continues to have mood swings and states she cries for no reason infrequently.  Patient reports she continues to adhere to medication regimen as prescribed states she questions its efficacy.  Patient adamantly convincingly denies suicidal ideation and vehemently denies any substance use.      Clinical Maneuvering/Intervention:  Assisted patient in processing above session content; acknowledged and normalized patient’s thoughts, feelings, and concerns.  Allowed patient to discuss/process her feelings and frustrations concerning her youngest son's incarceration and the difficulty he has caused in the family and validated her feelings.  Assisted the  patient in identifying appropriate coping mechanisms to decrease severity and frequency of symptoms of depression and anxiety including positive self talk, deep breathing exercises, guided imagery, daily affirmations, and actively seeking enjoyable activities she can engage in on a regular basis to decrease idle time and social isolation.  Also utilized cognitive behavioral therapy to assist the patient in recognizing she cannot be overly responsible for how things go with her son over for the care of his children.  Also assisted the patient in identifying specific factors of healthy sleep hygiene including regular bedtimes, regular wake times, avoidance of caffeine, avoidance of screened time after going to bed, and creating a calm, comfortable sleep environment.  Provided unconditional positive regard in a safe, supportive environment.    Allowed patient to freely discuss issues without interruption or judgment. Provided safe, confidential environment to facilitate the development of positive therapeutic relationship and encourage open, honest communication. Assisted patient in identifying risk factors which would indicate the need for higher level of care including thoughts to harm self or others and/or self-harming behavior and encouraged patient to contact this office, call 911, or present to the nearest emergency room should any of these events occur. Discussed crisis intervention services and means to access.  Patient adamantly and convincingly denies current suicidal or homicidal ideation or perceptual disturbance.    Assessment    The patient continues to struggle with significant symptoms of depression and anxiety which is currently exacerbated by her youngest son's ongoing incarceration and her perceived increasing responsibility for the care of well-being of HER-2 grandchildren.  As a result, the patient would likely be at increased risk for decompensation without ongoing treatment.    Diagnoses and all  orders for this visit:    Severe episode of recurrent major depressive disorder, without psychotic features    Generalized anxiety disorder               Mental Status Exam  Hygiene:  good  Dress:  casual  Attitude:  Cooperative  Motor Activity:  Appropriate  Speech:  Normal  Mood:  depressed  Affect:  depressed  Thought Processes:  Goal directed and Linear  Thought Content:  normal  Suicidal Thoughts:  denies  Homicidal Thoughts:  denies  Crisis Safety Plan: yes, to come to the emergency room.  Hallucinations:  denies    Patient's Support Network Includes:  children    Progress toward goal: Not at goal    Functional assessment: The patient's symptomology continues to cause significant impairment important functioning including social interpersonal domains.  She continues to struggle with social situations and ongoing significant social isolation.    Prognosis: Fair with ongoing treatment    Plan         The patient will continue in individual outpatient psychotherapy sessions at the [facility] every 2-3 weeks and pharmacotherapy as scheduled with Avelino Wasserman M.D.  Patient will continue to adhere to medication regimen as prescribed and report any side effects.  Patient will contact this office, call 911 or present to the nearest emergency room should suicidal or homicidal ideations occur. Provide Cognitive Behavioral Therapy and Solution Focused Therapy to improve functioning, maintain stability, and avoid decompensation and the need for higher level of care.          Return in about 3 weeks (around 09/12/2017) for Next scheduled follow up.    Jovany Ellis, KANDI,[facility] [place]

## 2017-09-05 ENCOUNTER — OFFICE VISIT (OUTPATIENT)
Dept: PSYCHIATRY | Facility: CLINIC | Age: 47
End: 2017-09-05

## 2017-09-05 DIAGNOSIS — F41.1 GENERALIZED ANXIETY DISORDER: ICD-10-CM

## 2017-09-05 DIAGNOSIS — F33.1 MAJOR DEPRESSIVE DISORDER, RECURRENT EPISODE, MODERATE (HCC): Primary | ICD-10-CM

## 2017-09-05 PROCEDURE — 90834 PSYTX W PT 45 MINUTES: CPT | Performed by: SOCIAL WORKER

## 2017-09-05 NOTE — PROGRESS NOTES
Date of Service: September 5, 2017  Time In: 2:45 PM  Time Out: 3:30 PM      PROGRESS NOTE  Data:  Sangeeta Aguilar is a 47 y.o. female who met 1:1 with Jovany Ellis LCSW, LCADC for regularly scheduled individual outpatient psychotherapy session at the [facility] for follow-up of depression and anxiety.  The patient brings her 18-month-old granddaughter to session and states she has been babysitting today.  Patient reports she continues to live just down the road from her ex- or her daughter-in-law and grandchildren live.  She states she likes living there due to the fact her grandchildren can visit her on a regular basis.  She also continues to discuss the difficulty with her son as he continues to be incarcerated and has an uncertain future.     HPI: The patient continues to report moderate symptoms of depression including depressed mood, irritability, anhedonia, anergia, periods of hopelessness, and periods of social isolation.  She rates current symptoms of depression at a 5 on a scale of 1-10 with 10 being most severe.  She also continues to struggle with anxiety including anxious mood, feeling on edge, feeling overwhelmed, increased heart rate, mind goes blank, psychomotor agitation, and a sense of impending doom.  The patient rates current symptoms of anxiety at 6 on scale 1-10 with 10 being most severe.  She reports she continues to struggle with various health issues and continues in treatment with multiple providers.  Patient reports she continues to adhere to medication regimen as prescribed.  The patient adamantly and convincingly denies suicidal ideation and vehemently denies any substance use.      Clinical Maneuvering/Intervention:  Assisted patient in processing above session content; acknowledged and normalized patient’s thoughts, feelings, and concerns.  Allowed patient to discuss her feelings concerning her recent move and validated her happiness with being closer to her grandchildren.   However, utilize motivational interviewing techniques including complex reflections to assist the patient in recognizing her tendency to assume full responsibility for her grandchildren and encouraged her to remind herself she neither has the ability or the responsibility.  Utilize cognitive behavioral therapy to assist the patient in identifying appropriate coping mechanisms to decrease severity and frequency of symptoms.  Provided unconditional positive regard safe, supportive environment.    Allowed patient to freely discuss issues without interruption or judgment. Provided safe, confidential environment to facilitate the development of positive therapeutic relationship and encourage open, honest communication. Assisted patient in identifying risk factors which would indicate the need for higher level of care including thoughts to harm self or others and/or self-harming behavior and encouraged patient to contact this office, call 911, or present to the nearest emergency room should any of these events occur. Discussed crisis intervention services and means to access.  Patient adamantly and convincingly denies current suicidal or homicidal ideation or perceptual disturbance.    Assessment    Patient appears to have made some progress with depression appearing to be moderate at this time.  However, she does continue to struggle with ongoing symptoms which continue to cause significant impairment in important areas of functioning.  As a result, the patient can be reasonably expected to continue to benefit from treatment and would likely be at increased risk of her eyes.    Diagnoses and all orders for this visit:    Major depressive disorder, recurrent episode, moderate    Generalized anxiety disorder             Mental Status Exam  Hygiene:  good  Dress:  casual  Attitude:  Cooperative  Motor Activity:  Appropriate  Speech:  Normal  Mood:  dysthymic  Affect:  calm and pleasant  Thought Processes:  Goal  directed  Thought Content:  normal  Suicidal Thoughts:  denies  Homicidal Thoughts:  denies  Crisis Safety Plan: yes, to come to the emergency room.  Hallucinations:  denies    Patient's Support Network Includes:  children and extended family    Progress toward goal: Not at goal    Functional Status: Mild impairment     Prognosis: Fair with Ongoing Treatment     Plan         Patient will continue in individual outpatient psychotherapy sessions at the [facility] every 3-4 weeks.  Patient will continue in pharmacotherapy with Dr. Wasserman as scheduled and adhere to medication regimen as prescribed and report any side effects. Patient will contact this office, call 911 or present to the nearest emergency room should suicidal or homicidal ideations occur. Provide Cognitive Behavioral Therapy and Solution Focused Therapy along with motivational interviewing techniques to improve functioning, maintain stability, and avoid decompensation and the need for higher level of care.          Return in about 3 weeks (around 09/26/2017) for Next scheduled follow up.    Jovany Ellis, KANDI,L[facility] HospitalDC

## 2017-09-13 ENCOUNTER — OFFICE VISIT (OUTPATIENT)
Dept: PSYCHIATRY | Facility: CLINIC | Age: 47
End: 2017-09-13

## 2017-09-13 VITALS
BODY MASS INDEX: 23.59 KG/M2 | DIASTOLIC BLOOD PRESSURE: 73 MMHG | HEART RATE: 80 BPM | HEIGHT: 66 IN | SYSTOLIC BLOOD PRESSURE: 116 MMHG | WEIGHT: 146.8 LBS

## 2017-09-13 DIAGNOSIS — F41.1 GENERALIZED ANXIETY DISORDER: Primary | ICD-10-CM

## 2017-09-13 DIAGNOSIS — F33.2 SEVERE EPISODE OF RECURRENT MAJOR DEPRESSIVE DISORDER, WITHOUT PSYCHOTIC FEATURES (HCC): ICD-10-CM

## 2017-09-13 PROCEDURE — 99213 OFFICE O/P EST LOW 20 MIN: CPT | Performed by: PSYCHIATRY & NEUROLOGY

## 2017-09-13 RX ORDER — TRAZODONE HYDROCHLORIDE 50 MG/1
TABLET ORAL
Qty: 30 TABLET | Refills: 2 | Status: SHIPPED | OUTPATIENT
Start: 2017-09-13 | End: 2018-01-18

## 2017-09-13 RX ORDER — BUSPIRONE HYDROCHLORIDE 30 MG/1
30 TABLET ORAL 2 TIMES DAILY
Qty: 60 TABLET | Refills: 2 | Status: SHIPPED | OUTPATIENT
Start: 2017-09-13 | End: 2018-01-18 | Stop reason: SDUPTHER

## 2017-09-13 RX ORDER — VENLAFAXINE HYDROCHLORIDE 150 MG/1
CAPSULE, EXTENDED RELEASE ORAL
Qty: 60 CAPSULE | Refills: 2 | Status: SHIPPED | OUTPATIENT
Start: 2017-09-13 | End: 2018-01-18 | Stop reason: SDUPTHER

## 2017-09-13 NOTE — PROGRESS NOTES
"Outpatient Psychiatric Progress Note    CC:f/u depression and SHA    HX:  The patient reports that her mood has been low and her anxiety is been high.  She continues to stress over her family situation including her son's incarceration.  He is scheduled to get out of senior care soon.  She is anxious also about having to care for her grandchildren since her daughter-in-law will be getting work in the next 2 weeks.  She denies any suicidal or homicidal thoughts.     Depression rating 9/10  Anxiety rating 9/10  Appetite-low   Energy level-low  Sleep-was good until the last week.  She has been getting 4 hours this week.     I have reviewed pt's allergies and current medications.     Review of Systems   Constitutional: Negative.    Respiratory: Negative.    Cardiovascular: Positive for palpitations.   Neurological: Negative.      /73  Pulse 80  Ht 66\" (167.6 cm)  Wt 146 lb 12.8 oz (66.6 kg)  BMI 23.69 kg/m2    EXAM: Casually dressed, good hygiene, evasive eye contact.  Gait and station stable.  Slightly restless.. No motor tics Speech is normal rate, amount. Associations intact. Mood \"down\" affect constricted, but euthymic, stable.. TC-goal directed.  Denies SI/HI/VH/AH. TP-linear.  Attention and concentration are fair. Memory is intact for recent and remote events. Insight-limited, judgement-fair      Encounter Diagnoses   Name Primary?   • Generalized anxiety disorder Yes   • Severe episode of recurrent major depressive disorder, without psychotic features        Current Outpatient Prescriptions   Medication Sig Dispense Refill   • atenolol (TENORMIN) 25 MG tablet      • betamethasone dipropionate (DIPROLENE) 0.05 % cream      • busPIRone (BUSPAR) 30 MG tablet Take 1 tablet by mouth 2 (Two) Times a Day. 60 tablet 2   • gabapentin (NEURONTIN) 600 MG tablet      • HYDROcodone-acetaminophen (NORCO)  MG per tablet      • hydroxychloroquine (PLAQUENIL) 200 MG tablet      • levocetirizine (XYZAL) 5 MG tablet      • " omeprazole (priLOSEC) 40 MG capsule      • raNITIdine (ZANTAC) 300 MG tablet      • venlafaxine XR (EFFEXOR XR) 150 MG 24 hr capsule Take 2 capsules by mouth daily 60 capsule 2   • traZODone (DESYREL) 50 MG tablet 1/2 -1 tab nightly as needed 30 tablet 2   • triamcinolone (KENALOG) 0.1 % cream        No current facility-administered medications for this visit.      Plan:    1.  Begin trazodone 25-50 mg nightly as needed for sleep.  I reviewed the risks, benefits, and side effects.  2.  Continue Effexor  mg daily and BuSpar 30 mg twice daily for depression and anxiety symptoms  3.  Continue psychotherapy with Jovany Ellis  4.  Return to clinic in 3 months or sooner if needed.        The risks, benefits, and treatment alternatives were discussed with the patient.     TIME IN:246PM  TIME OUT:309PM

## 2017-10-03 ENCOUNTER — OFFICE VISIT (OUTPATIENT)
Dept: PSYCHIATRY | Facility: CLINIC | Age: 47
End: 2017-10-03

## 2017-10-03 DIAGNOSIS — F33.1 MAJOR DEPRESSIVE DISORDER, RECURRENT EPISODE, MODERATE (HCC): ICD-10-CM

## 2017-10-03 DIAGNOSIS — F41.1 GENERALIZED ANXIETY DISORDER: Primary | ICD-10-CM

## 2017-10-03 PROCEDURE — 90834 PSYTX W PT 45 MINUTES: CPT | Performed by: SOCIAL WORKER

## 2017-10-03 NOTE — PROGRESS NOTES
Date of Service: October 3, 2017  Time In: 2:28 PM  Time Out: 3:10 PM      PROGRESS NOTE  Data:  Sangeeta Aguilar is a 47 y.o. female who met 1:1 with Jovany Ellis LCSW, ZAHIDA  for regularly scheduled individual outpatient psychotherapy session at the [facility] for follow-up of depression and anxiety.  Patient reports she has been recently working for someone at fle[employer] in an attempt to make extra money to assist with caring for her grandchildren.  She also reports her youngest son was recently transferred from longterm to a local rehabilitation program.     HPI: Patient reports she continues to feel great deal of stress and states she continues to struggle with depression including sad mood, anhedonia, anergia, feeling hopeless, insomnia.  Patient rates current symptoms of depression at 5 on a scale of 1-10 with 10 being most severe.  Patient also continues to struggle with feeling on edge, feeling overwhelmed, increased heart rate, trembling, mind goes blank, and a sense of impending doom.  The patient rates current symptoms of anxiety at a 5 on a scale of 1-10 with 10 being most severe.  Patient reports she continues to adhere to medication regimen as prescribed.  The patient adamantly convincingly denies suicidal ideation and vehemently denies any substance use.      Clinical Maneuvering/Intervention:  Assisted patient in processing above session content; acknowledged and normalized patient’s thoughts, feelings, and concerns.  Allow the patient to discuss/vent her feelings and frustrations with the current situation with her family and validated her feelings.  However, utilize cognitive behavioral therapy to discussed the concept of things we can't change things we cannot change and encouraged patient to remind herself she cannot be fully responsible for the happiness and well-being of everyone.  Also praised the patient for her willingness to do whatever necessary to care for her grandchildren.   Utilized motivational interviewing techniques including complex reflections to assist the patient in recognizing none of the issues her children are having is her fault and she cannot force them to behave the way she would like.  Provided unconditional positive regard in a safe, supportive environment.    Allowed patient to freely discuss issues without interruption or judgment. Provided safe, confidential environment to facilitate the development of positive therapeutic relationship and encourage open, honest communication. Assisted patient in identifying risk factors which would indicate the need for higher level of care including thoughts to harm self or others and/or self-harming behavior and encouraged patient to contact this office, call 911, or present to the nearest emergency room should any of these events occur. Discussed crisis intervention services and means to access.  Patient adamantly and convincingly denies current suicidal or homicidal ideation or perceptual disturbance.    Assessment    Patient appears to be struggling with a great deal of family stress at this time which likely exacerbates her symptoms.  As a result, she can be really expected to continue to benefit from treatment and would likely be at increased risk for decompensation otherwise.    Diagnoses and all orders for this visit:    Generalized anxiety disorder    Major depressive disorder, recurrent episode, moderate               Mental Status Exam  Hygiene:  good  Dress:  casual  Attitude:  Cooperative  Motor Activity:  Appropriate  Speech:  Normal  Mood:  depressed  Affect:  calm and pleasant  Thought Processes:  Goal directed  Thought Content:  normal  Suicidal Thoughts:  denies  Homicidal Thoughts:  denies  Crisis Safety Plan: yes, to come to the emergency room.  Hallucinations:  denies    Patient's Support Network Includes:  children and Grandchildren    Progress toward goal: Not at goal    Functional Status: Mild impairment      Prognosis: Fair with Ongoing Treatment     Plan         Patient will continue in individual outpatient psychotherapy sessions approximately every 3-4 weeks at the [facility] and pharmacotherapy as scheduled with Dr. Wasserman.  Patient will adhere to medication regimen as prescribed and report any side effects. Patient will contact this office, call 911 or present to the nearest emergency room should suicidal or homicidal ideations occur. Provide Cognitive Behavioral Therapy and Solution Focused Therapy to improve functioning, maintain stability, and avoid decompensation and the need for higher level of care.          Return in about 3 weeks (around 10/24/2017) for Next scheduled follow up.    Jovany Ellis LCSW, ZAHIDA     This document signed by Jovany Ellis LCSW, ZAHIDA October 3, 2017 5:10 PM

## 2017-10-03 NOTE — TREATMENT PLAN
Multi-Disciplinary Problems (from Behavioral Health Treatment Plan)    Active Problems     Problem:  Patient Care Overview (Adult) (Priority: --)  (Start Date: 10/03/17) (Resolve Date: --)    Problem Details:  Client continues to struggle with depressed mood, anhedonia, periods of hopelessness, anergia, and social isolation. Patient rates symptoms at a 5 on a scale of 1-10 with 10 being the most severe. She also continues to report feeling on edge, feeling overwhelmed, increased heart rate, a general sense of uncertainty, and periods of sense of impending doom. She rates current symptoms at a 5 on a scale of 1-10 with 10 being the most severe.        Goal Start Date End Date    Plan of Care Review 10/03/17 --    Goal Details:  The patient will continue in monthly individual psychotherapy sessions at the [facility] and pharmacotherapy as scheduled. Continue to provide cognitive behavioral therapy and solution focused therapy along with motivational interviewing techniques to assist with developing appropriate coping skills to decrease severity and frequency of symptoms. Continue to provide safe, confidential environment to facilitate the maintenance of positive therapeutic relationship and foster open, honest communication.                           I have discussed and reviewed this treatment plan with the patient.  It has been printed for signatures.     This document signed by Jovany Ellis LCSW, ZAHIDA October 3, 2017 2:53 PM

## 2017-10-24 ENCOUNTER — OFFICE VISIT (OUTPATIENT)
Dept: PSYCHIATRY | Facility: CLINIC | Age: 47
End: 2017-10-24

## 2017-10-24 DIAGNOSIS — F41.1 GENERALIZED ANXIETY DISORDER: Primary | ICD-10-CM

## 2017-10-24 DIAGNOSIS — F33.1 MAJOR DEPRESSIVE DISORDER, RECURRENT EPISODE, MODERATE (HCC): ICD-10-CM

## 2017-10-24 PROCEDURE — 90832 PSYTX W PT 30 MINUTES: CPT | Performed by: SOCIAL WORKER

## 2017-10-24 NOTE — PROGRESS NOTES
"Date of Service: October 24, 2017  Time In: 1:33 PM  Time Out: 2:00 PM      PROGRESS NOTE  Data:  Sangeeta Aguilar is a 47 y.o. female who met with the undersigned for a regularly scheduled individual outpatient psychotherapy session at  the [facility] for follow-up of depression and anxiety.  Patient reports she continues to live alone in a home in [place] and states she continues to attempt to babysit her grandchildren.  Patient reports she also continues to be stressed over her youngest son who continues to be in long-term treatment for chemical dependence.  Patient also reports she continues to struggle with various health issues including COPD and dermatological issues.     HPI: The patient reports she hasn't been feeling well as of late and states \"I'm just aggravated with life I guess\".  Patient continues to struggle with depressed mood, anhedonia, anergia, irritability, difficulty concentrating, periods of hopelessness and helplessness, decreased motivation, and social isolation.  Patient rates current symptoms of depression at 6 on a scale of 1-10 with 10 being most severe.  She also continues to struggle with feeling on edge, feeling overwhelmed, increased heart rate, mind goes blank, and a periodic sense of impending doom.  The patient rates current symptoms of anxiety at 7 on a scale of 1-10 with 10 being most severe.  Patient reports she continues to adhere to medication regimen as prescribed.  The patient adamantly convincingly denies suicidal ideation and vehemently denies any substance use.      Clinical Maneuvering/Intervention:  Assisted patient in processing above session content; acknowledged and normalized patient’s thoughts, feelings, and concerns.  Utilized motivational interviewing techniques including complex reflect is to assist the patient in processing her current feelings of \"aggravation\" and validated her feelings.  Also discussed the potential negative impact of ongoing " health issues of the patient's mood state and encouraged her to keep appointments with appropriate providers and follow recommendations.  Also validated the patient's belief that babysitting HER-2-year-old granddaughter is becoming untenable.  Utilize cognitive behavioral therapy to assist the patient in identifying and acknowledging appropriate coping mechanisms to reduce the severity and frequency of symptoms including positive self talk, relaxation techniques, thought blocking techniques, and challenging faulty, irrational thoughts with factual counter arguments.  Provided unconditional positive regard in a safe, supportive environment.    Allowed patient to freely discuss issues without interruption or judgment. Provided safe, confidential environment to facilitate the development of positive therapeutic relationship and encourage open, honest communication. Assisted patient in identifying risk factors which would indicate the need for higher level of care including thoughts to harm self or others and/or self-harming behavior and encouraged patient to contact this office, call 911, or present to the nearest emergency room should any of these events occur. Discussed crisis intervention services and means to access.  Patient adamantly and convincingly denies current suicidal or homicidal ideation or perceptual disturbance.    Assessment    The patient appears to maintain relative stability as compared to her baseline.  However, the totality of her symptomology continues to cause impairment in important areas of functioning.  As result, she can be recently expected to continue to benefit from treatment and will likely be at increased risk for decompensation otherwise.    Diagnoses and all orders for this visit:    Generalized anxiety disorder    Major depressive disorder, recurrent episode, moderate               Mental Status Exam  Hygiene:  good  Dress:  casual  Attitude:  Cooperative  Motor Activity:   Appropriate  Speech:  Monotone  Mood:  anxious  Affect:  calm and pleasant  Thought Processes:  Goal directed  Thought Content:  normal  Suicidal Thoughts:  denies  Homicidal Thoughts:  denies  Crisis Safety Plan: yes, to come to the emergency room.  Hallucinations:  denies    Patient's Support Network Includes:  daughter    Progress toward goal: Not at goal    Functional Status: Moderate impairment     Prognosis: Fair with Ongoing Treatment     Plan         Patient will continue in individual outpatient psychotherapy session at the [facility] every 3-4 weeks and pharmacotherapy as scheduled with Dr. Wasserman.  Patient will adhere to medication regimen as prescribed and report any side effects. Patient will contact this office, call 911 or present to the nearest emergency room should suicidal or homicidal ideations occur. Provide Cognitive Behavioral Therapy and Solution Focused Therapy to improve functioning, maintain stability, and avoid decompensation and the need for higher level of care.          Return in about 4 weeks (around 11/21/2017) for Next scheduled follow up.    Jovany Ellis LCSW, ZAHIDA October 24, 2017    This document signed by Jovany Ellis LCSW, ZAHIDA October 24, 2017 5:54 PM

## 2017-11-21 ENCOUNTER — OFFICE VISIT (OUTPATIENT)
Dept: PSYCHIATRY | Facility: CLINIC | Age: 47
End: 2017-11-21

## 2017-11-21 DIAGNOSIS — F41.1 GENERALIZED ANXIETY DISORDER: Primary | ICD-10-CM

## 2017-11-21 DIAGNOSIS — F33.1 MAJOR DEPRESSIVE DISORDER, RECURRENT EPISODE, MODERATE (HCC): ICD-10-CM

## 2017-11-21 PROCEDURE — 90837 PSYTX W PT 60 MINUTES: CPT | Performed by: SOCIAL WORKER

## 2017-11-21 NOTE — PROGRESS NOTES
Date of Service: November 21, 2017  Time In: 12:33 PM  Time Out: 1:27 PM      PROGRESS NOTE  Data:  Sangeeta Aguilar is a 47 y.o. female who met with the undersigned for a regularly scheduled individual outpatient psychotherapy session at  the [facility] for follow-up of anxiety and depression.  Patient reports she continues to struggle with her daughter-in-law and states she is fearful her daughter-in-law will leave the state with her mother when she arrives in December.  Patient reports her youngest son who is currently in rehabilitation program appears to be doing well.  However, she states she suspects her oldest son is in active addiction.  As a result, she states he has chosen to discontinue regular contact.     HPI: Patient reports she feels she is currently struggling with significant depression as evidenced by depressed mood, anhedonia, anergia, difficulty concentrating, or hypersomnia, feelings of hopelessness, decreased motivation, and social isolation.  She reports she is sleeping 12-15 hours daily.  The patient rates current symptoms of depression at an 8 on a scale of 1-10 with 10 being most severe.  Patient also continues to struggle with anxiety as evidenced by anxious mood, feeling on edge, feeling overwhelmed, tendency to lash out at others, increased heart rate, nausea, and a sense of impending doom.  Patient rates current symptoms of anxiety at 5 on a scale of 1-10 with 10 being most severe.  Patient reports she continues to adhere to medication regimen as prescribed.  The patient adamantly convincingly denies suicidal ideation vehemently denies any substance use.      Clinical Maneuvering/Intervention:  Assisted patient in processing above session content; acknowledged and normalized patient’s thoughts, feelings, and concerns.  Allow the patient to discuss/vent her difficulties with her children and validated her feelings.  However, he utilized motivational interviewing techniques including  complex reflections to assist the patient in recognizing they're both adults and she cannot control their decisions or behaviors.  Also discussed the mutually self-perpetuating nature isolation and the patient's symptoms and encouraged her to go to a family member's house for Thanksgiving as opposed to staying home alone.  Also utilized cognitive behavioral therapy to assist the patient in developing appropriate coping mechanisms to decrease severity and frequency of symptoms including positive self talk, daily affirmations, challenging faulty, irrational thoughts with factual counter arguments, and focusing on the skills of daily living.  Provided unconditional positive regard in a safe, supportive environment.    Allowed patient to freely discuss issues without interruption or judgment. Provided safe, confidential environment to facilitate the development of positive therapeutic relationship and encourage open, honest communication. Assisted patient in identifying risk factors which would indicate the need for higher level of care including thoughts to harm self or others and/or self-harming behavior and encouraged patient to contact this office, call 911, or present to the nearest emergency room should any of these events occur. Discussed crisis intervention services and means to access.  Patient adamantly and convincingly denies current suicidal or homicidal ideation or perceptual disturbance.    Assessment    Patient appears to be struggling with slightly increased symptoms of depression which likely are precipitated by ongoing stress within the family.  The patient's symptoms are also likely exacerbated by ongoing social isolation.  As a result, the patient can be reasonably expected to continue to benefit from treatment and would likely be at increased risk for decompensation otherwise.    Diagnoses and all orders for this visit:    Generalized anxiety disorder    Major depressive disorder, recurrent episode,  moderate               Mental Status Exam  Hygiene:  good  Dress:  casual  Attitude:  Cooperative  Motor Activity:  Slow  Speech:  Monotone  Mood:  depressed  Affect:  depressed  Thought Processes:  Goal directed  Thought Content:  normal  Suicidal Thoughts:  denies  Homicidal Thoughts:  denies  Crisis Safety Plan: yes, to come to the emergency room.  Hallucinations:  denies    Patient's Support Network Includes:  Grandchildren    Progress toward goal: Not at goal    Functional Status: Moderate impairment     Prognosis: Fair with Ongoing Treatment     Plan         Patient will continue in individual outpatient psychotherapy session at the [facility] monthly and pharmacotherapy as scheduled with Dr. Wasserman.  Patient will adhere to medication regimen as prescribed and report any side effects. Patient will contact this office, call 911 or present to the nearest emergency room should suicidal or homicidal ideations occur. Provide Cognitive Behavioral Therapy and Integrative Therapy to improve functioning, maintain stability, and avoid decompensation and the need for higher level of care.          Return in about 4 weeks (around 12/19/2017) for Next scheduled follow up.      This document signed by Jovany Ellis LCSW, November 21, 2017 4:46 PM

## 2017-12-13 ENCOUNTER — OFFICE VISIT (OUTPATIENT)
Dept: PSYCHIATRY | Facility: CLINIC | Age: 47
End: 2017-12-13

## 2017-12-13 DIAGNOSIS — F33.1 MAJOR DEPRESSIVE DISORDER, RECURRENT EPISODE, MODERATE (HCC): ICD-10-CM

## 2017-12-13 DIAGNOSIS — F41.1 GENERALIZED ANXIETY DISORDER: Primary | ICD-10-CM

## 2017-12-13 PROCEDURE — 90832 PSYTX W PT 30 MINUTES: CPT | Performed by: SOCIAL WORKER
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
## 2018-01-18 ENCOUNTER — OFFICE VISIT (OUTPATIENT)
Dept: PSYCHIATRY | Facility: CLINIC | Age: 48
End: 2018-01-18

## 2018-01-18 VITALS
HEIGHT: 66 IN | SYSTOLIC BLOOD PRESSURE: 127 MMHG | DIASTOLIC BLOOD PRESSURE: 81 MMHG | WEIGHT: 158 LBS | BODY MASS INDEX: 25.39 KG/M2 | HEART RATE: 77 BPM

## 2018-01-18 DIAGNOSIS — F33.1 MODERATE EPISODE OF RECURRENT MAJOR DEPRESSIVE DISORDER (HCC): ICD-10-CM

## 2018-01-18 DIAGNOSIS — F41.1 GENERALIZED ANXIETY DISORDER: Primary | ICD-10-CM

## 2018-01-18 PROCEDURE — 99213 OFFICE O/P EST LOW 20 MIN: CPT | Performed by: PSYCHIATRY & NEUROLOGY

## 2018-01-18 RX ORDER — VENLAFAXINE HYDROCHLORIDE 150 MG/1
CAPSULE, EXTENDED RELEASE ORAL
Qty: 180 CAPSULE | Refills: 0 | Status: SHIPPED | OUTPATIENT
Start: 2018-01-18 | End: 2018-04-18 | Stop reason: SDUPTHER

## 2018-01-18 RX ORDER — BUSPIRONE HYDROCHLORIDE 30 MG/1
30 TABLET ORAL 2 TIMES DAILY
Qty: 180 TABLET | Refills: 0 | Status: SHIPPED | OUTPATIENT
Start: 2018-01-18 | End: 2018-04-18 | Stop reason: SDUPTHER

## 2018-01-18 RX ORDER — TIZANIDINE 4 MG/1
4 TABLET ORAL NIGHTLY PRN
COMMUNITY

## 2018-01-18 RX ORDER — IPRATROPIUM BROMIDE 17 UG/1
2 AEROSOL, METERED RESPIRATORY (INHALATION)
COMMUNITY
Start: 2017-11-09

## 2018-01-18 RX ORDER — DUPILUMAB 300 MG/2ML
INJECTION, SOLUTION SUBCUTANEOUS
COMMUNITY
Start: 2017-12-29 | End: 2022-03-08

## 2018-01-18 NOTE — PROGRESS NOTES
"Outpatient Psychiatric Progress Note    CC: Follow-up for depression and anxiety.    HX:  [place]ia was seen for follow-up for depression and anxiety today.  She reports overall she has been \"pretty good.\"  She said her mood has been \"all right.\"  No suicidal or homicidal thoughts.  She has been very anxious particularly about her son who is currently in [facility] after he left court ordered rehabilitation.  He is pressuring her to bring him things like he cigarettes and phone cards.  She is trying to set limits and also worried about what will happen after he leaves [facility].  Specifically, she is wanting him to get further treatment, but is worried that he will be allowed to just leave.  Medication side effects.  She quit the trazodone because it was too sedating.    Depression rating 3/10  Anxiety rating 10/10  Appetite-good  Energy level-fair Sleep-fair  I have reviewed pt's allergies and current medications.     Review of Systems   Constitutional: Negative.    Cardiovascular: Negative.    Gastrointestinal: Negative.    Neurological: Negative.      /81  Pulse 77  Ht 167 cm (65.75\")  Wt 71.7 kg (158 lb)  BMI 25.7 kg/m2    EXAM: Neatly, casually dressed, good hygeine. Gait and station stable. No psychomotor agitation/retardation. No motor tics Speech is normal rate, amount. Associations intact. Mood \"I'm alright\" affect euthymic, stable.. TC-goal directed.  Denies SI/HI/VH/AH. TP-linear.  Attention and concentration are fair. Memory is intact for recent and remote events. Insight-fair, judgement-fair      Encounter Diagnoses   Name Primary?   • Generalized anxiety disorder Yes   • Moderate episode of recurrent major depressive disorder        Current Outpatient Prescriptions   Medication Sig Dispense Refill   • atenolol (TENORMIN) 25 MG tablet      • ATROVENT HFA 17 MCG/ACT inhaler      • betamethasone dipropionate (DIPROLENE) 0.05 % cream      • busPIRone (BUSPAR) 30 MG tablet Take 1 tablet by mouth 2 (Two) Times " a Day. 180 tablet 0   • DUPIXENT 300 MG/2ML solution prefilled syringe      • gabapentin (NEURONTIN) 600 MG tablet      • hydroxychloroquine (PLAQUENIL) 200 MG tablet      • levocetirizine (XYZAL) 5 MG tablet      • omeprazole (priLOSEC) 40 MG capsule      • raNITIdine (ZANTAC) 300 MG tablet      • tiZANidine (ZANAFLEX) 4 MG tablet Take 4 mg by mouth At Night As Needed for Muscle Spasms.     • triamcinolone (KENALOG) 0.1 % cream      • venlafaxine XR (EFFEXOR XR) 150 MG 24 hr capsule Take 2 capsules by mouth daily 180 capsule 0   • HYDROcodone-acetaminophen (NORCO)  MG per tablet        No current facility-administered medications for this visit.    Plan:  1. Continue Effexor  mg daily.   2.  Continue BuSpar 30 mg twice a day  3.  Continue therapy with Jovany Ellis  4.  Return to clinic 3 months    TIME IN:242PM  TIME OUT:305    This provider is located at [facility], Behavioral health, [address], [address] in [facility].  The patient is seen remotely at [facility], [facility], [address] [place] [address], using [employer], an encrypted service from one [facility] to another, with staff present. The patient's condition being diagnosed/treated is appropriate for telemedecine.  The provider identified himself and his credentials.     The patient and/or patient's guardian consent to be seen remotely, and when consent is given they understand that the consent allows for patient identifiable information to be sent to a third party as needed.  They may refuse to be seen remotely at any time.  The electronic data is encrypted and password protected, and the patient has been advised of the potential risks to privacy notwithstanding such measures.

## 2018-03-14 ENCOUNTER — OFFICE VISIT (OUTPATIENT)
Dept: PSYCHIATRY | Facility: CLINIC | Age: 48
End: 2018-03-14

## 2018-03-14 DIAGNOSIS — F33.1 MAJOR DEPRESSIVE DISORDER, RECURRENT EPISODE, MODERATE (HCC): ICD-10-CM

## 2018-03-14 DIAGNOSIS — F41.1 GENERALIZED ANXIETY DISORDER: Primary | ICD-10-CM

## 2018-03-14 PROCEDURE — 90837 PSYTX W PT 60 MINUTES: CPT | Performed by: SOCIAL WORKER

## 2018-03-14 NOTE — PROGRESS NOTES
Date of Service: March 14, 2018  Time In: 2:05 PM  Time Out: 3:00 PM      PROGRESS NOTE  Data:  Sangeeta Aguilar is a 47 y.o. female who met with the undersigned for a regularly scheduled individual outpatient psychotherapy session at  the [facility] for follow-up of depression and anxiety.     HPI: Patient continues to struggle with increased symptoms which continue to be exacerbated by significant stress with both her adult sons.  Patient reports her youngest son absconded from substance abuse treatment which resulted in him being incarcerated and return to treatment.  She also reports she continues to feel great deal of responsibility for her grandchildren and states she continues to supply financial resources.  Patient reports she is frustrated and states she is considering moving to [place] with her daughter.  Patient continues to struggle with depression as evidenced by depressed mood, anhedonia, anergia, periods of hopelessness, difficulty concentrating, and periods of social isolation.  Patient rates current symptoms of depression at 6 on a scale 1-10 with 10 being most severe.  Patient also continues to struggle with anxiety including anxious mood, feeling on edge, feeling overwhelmed, increased heart rate, muscle tension, and urged to escape the situation and a sense of impending doom.  Patient rates current symptoms of anxiety at 6 on scale 1-10 with 10 being most severe.  Patient reports approximately 2 weeks ago she was unable to drive due to having an anxiety attack.  Patient reports she continues to struggle with various medical issues and states she has been struggling with severe pain in her mid back region.  Patient reports she continues to adhere to medication regimen as prescribed.  Patient adamantly and convincingly denies suicidal ideation vehemently denies any substance use.      Clinical Maneuvering/Intervention:  Assisted patient in processing above session content; acknowledged and  normalized patient’s thoughts, feelings, and concerns.  Allowed patient to discuss/vent her feelings and frustrations concerning her 2 adult sons and validated her feelings.  Also utilized motivational interviewing techniques to discussed the concept of things we can't control and things we cannot control.  Encouraged the patient to consider although she loves her son's she cannot control their decisions or behaviors.  Also encouraged the patient to keep her appointments with providers to diagnose and treat her ongoing medical issues.  Also discussed the concept of codependency and encouraged the patient to become aware of her tendencies to fix everything for her children.  Validated the patient's plan to focus on herself and moved to [place] if she chooses.  Continue to utilize cognitive behavioral therapy to assist the patient in restructuring her thought process concerning her sons and encouraged her to remind herself she cannot be responsible for their behaviors as they are adults.  Provided unconditional positive regard in a safe, supportive environment.    Allowed patient to freely discuss issues without interruption or judgment. Provided safe, confidential environment to facilitate the development of positive therapeutic relationship and encourage open, honest communication. Assisted patient in identifying risk factors which would indicate the need for higher level of care including thoughts to harm self or others and/or self-harming behavior and encouraged patient to contact this office, call 911, or present to the nearest emergency room should any of these events occur. Discussed crisis intervention services and means to access.  Patient adamantly and convincingly denies current suicidal or homicidal ideation or perceptual disturbance.    Assessment    Patient continues to struggle with significant symptoms of depression and anxiety which continue to be exacerbated by significant psychosocial stressors  with her adult children.  As a result, the patient agrees and expects to continue to benefit from treatment and would likely be at increased risk for decompensation.    Diagnoses and all orders for this visit:    Generalized anxiety disorder    Major depressive disorder, recurrent episode, moderate               Mental Status Exam  Hygiene:  good  Dress:  casual  Attitude:  Cooperative  Motor Activity:  Appropriate  Speech:  Normal  Mood:  depressed  Affect:  depressed  Thought Processes:  Linear  Thought Content:  normal  Suicidal Thoughts:  denies  Homicidal Thoughts:  denies  Crisis Safety Plan: yes, to come to the emergency room.  Hallucinations:  denies    Patient's Support Network Includes:  extended family    Progress toward goal: Not at goal    Functional Status: Moderate impairment     Prognosis: Fair with Ongoing Treatment     Plan         Patient will continue in individual outpatient psychotherapy session at the [facility] monthly and pharmacotherapy as scheduled with Dr. Wasserman.  Patient will adhere to medication regimen as prescribed and report any side effects. Patient will contact this office, call 911 or present to the nearest emergency room should suicidal or homicidal ideations occur. Provide Cognitive Behavioral Therapy and Integrative Therapy to improve functioning, maintain stability, and avoid decompensation and the need for higher level of care.          Return in about 4 weeks (around 4/11/2018) for Next scheduled follow up.      This document signed by Jovany Ellis LCSW, [facility] March 14, 2018 4:39 PM

## 2018-03-14 NOTE — TREATMENT PLAN
Multi-Disciplinary Problems (from Behavioral Health Treatment Plan)    Active Problems     Problem:  Patient Care Overview (Adult)  Start Date: 03/14/18    Problem Details:  Client continues to struggle with depressed mood, anhedonia, periods of hopelessness, anergia, and social isolation. Patient rates symptoms at a 5 on a scale of 1-10 with 10 being the most severe. She also continues to report feeling on edge, feeling ove rwhelmed, increased heart rate, a general sense of uncertainty, and periods of sense of impending doom. She rates current symptoms at a 5 on a scale of 1-10 with 10 being the most severe. The patient continues to have ongoing medical issues, which contri butes to her psychiatric issues.      Goal Priority Start Date Expected End Date End Date    Plan of Care Review High 03/14/18 06/14/18 --    Goal Details:  The patient will continue in monthly individual psychotherapy sessions at the [facility] and pharmacotherapy as scheduled. Continue to provide cognitive behavioral therapy and solution focused therapy along with motivational interviewing techniques t o assist with developing appropriate coping skills to decrease severity and frequency of symptoms. Continue to provide safe, confidential environment to facilitate the maintenance of positive therapeutic relationship and foster open, honest communication .                          I have discussed and reviewed this treatment plan with the patient.  It has been printed for signatures.

## 2018-04-18 ENCOUNTER — OFFICE VISIT (OUTPATIENT)
Dept: PSYCHIATRY | Facility: CLINIC | Age: 48
End: 2018-04-18

## 2018-04-18 VITALS
SYSTOLIC BLOOD PRESSURE: 145 MMHG | WEIGHT: 159 LBS | HEART RATE: 82 BPM | DIASTOLIC BLOOD PRESSURE: 88 MMHG | BODY MASS INDEX: 25.55 KG/M2 | HEIGHT: 66 IN

## 2018-04-18 DIAGNOSIS — F41.1 GENERALIZED ANXIETY DISORDER: Primary | ICD-10-CM

## 2018-04-18 DIAGNOSIS — F33.1 MODERATE EPISODE OF RECURRENT MAJOR DEPRESSIVE DISORDER (HCC): ICD-10-CM

## 2018-04-18 PROCEDURE — 99213 OFFICE O/P EST LOW 20 MIN: CPT | Performed by: PSYCHIATRY & NEUROLOGY

## 2018-04-18 RX ORDER — ATENOLOL 50 MG/1
TABLET ORAL
COMMUNITY
Start: 2018-02-01 | End: 2022-03-08

## 2018-04-18 RX ORDER — BUSPIRONE HYDROCHLORIDE 30 MG/1
30 TABLET ORAL 2 TIMES DAILY
Qty: 180 TABLET | Refills: 0 | Status: SHIPPED | OUTPATIENT
Start: 2018-04-18 | End: 2018-07-25 | Stop reason: SDUPTHER

## 2018-04-18 RX ORDER — VENLAFAXINE HYDROCHLORIDE 150 MG/1
CAPSULE, EXTENDED RELEASE ORAL
Qty: 180 CAPSULE | Refills: 0 | Status: SHIPPED | OUTPATIENT
Start: 2018-04-18 | End: 2018-07-25 | Stop reason: SDUPTHER

## 2018-05-23 ENCOUNTER — OFFICE VISIT (OUTPATIENT)
Dept: PSYCHIATRY | Facility: CLINIC | Age: 48
End: 2018-05-23

## 2018-05-23 DIAGNOSIS — F33.1 MAJOR DEPRESSIVE DISORDER, RECURRENT EPISODE, MODERATE (HCC): ICD-10-CM

## 2018-05-23 DIAGNOSIS — F41.1 GENERALIZED ANXIETY DISORDER: Primary | ICD-10-CM

## 2018-05-23 PROCEDURE — 90837 PSYTX W PT 60 MINUTES: CPT | Performed by: SOCIAL WORKER

## 2018-05-23 NOTE — PROGRESS NOTES
Date of Service: May 23, 2018  Time In: 2:32 PM  Time Out: 3:28 PM      PROGRESS NOTE  Data:  Sangeeta Aguilar is a 47 y.o. female who met with the undersigned for a regularly scheduled individual outpatient psychotherapy session at  the [facility] for follow-up of depression and anxiety.     HPI: Patient reports she has been struggling with increased anxiety and depression as result of her ex-daughter-in-law abruptly taking her grandchildren to [place].  Patient reports she also continues to have significant anxiety and worry concerning her youngest son who is currently in a senior living house in [facility]. Patient reports no significant symptoms of depression at this time but states she continues to struggle with periodic depression as evidenced by depressed mood, anhedonia, anergia, periods of hopelessness, difficulty concentrating, and periods of social isolation.  Patient rates current symptoms of depression at 5 on a scale 1-10 with 10 being most severe.  Patient also continues to struggle with anxiety including anxious mood, feeling on edge, feeling overwhelmed, increased heart rate, muscle tension, and urged to escape the situation and a sense of impending doom.  Patient rates current symptoms of anxiety at 6 on scale 1-10 with 10 being most severe.    Patient reports she continues to struggle with various medical issues and states she has been struggling with severe pain in her mid back region.  Patient reports she continues to adhere to medication regimen as prescribed.  Patient adamantly and convincingly denies suicidal ideation vehemently denies any substance use.      Clinical Maneuvering/Intervention:  Assisted patient in processing above session content; acknowledged and normalized patient’s thoughts, feelings, and concerns.  Allow the patient to discuss/process her feelings and frustrations with the fact she is no longer able to see her grandchildren and validated her feelings.  However,  also utilized motivational interviewing techniques including complex reflections to assist her in recognizing and acknowledging she has little to no control over the situation.  Also allowed the patient to vent her feelings and frustrations concerning the behavior of her youngest son and challenged her to remind herself she cannot be responsible for the decisions or behaviors of others.  Utilized cognitive behavioral therapy to assist patient in developing appropriate coping mechanisms to decrease severity and frequency of symptoms including deep breathing exercises, challenging faulty, irrational thoughts with factual counter arguments, guided imagery, and strongly urged her to actively seek enjoyable activities she can engage in a regular basis.  Provided unconditional positive regard in a safe, supportive environment.     Allowed patient to freely discuss issues without interruption or judgment. Provided safe, confidential environment to facilitate the development of positive therapeutic relationship and encourage open, honest communication. Assisted patient in identifying risk factors which would indicate the need for higher level of care including thoughts to harm self or others and/or self-harming behavior and encouraged patient to contact this office, call 911, or present to the nearest emergency room should any of these events occur. Discussed crisis intervention services and means to access.  Patient adamantly and convincingly denies current suicidal or homicidal ideation or perceptual disturbance.    Assessment    Patient continues to struggle with  depression and anxiety which continue to be exacerbated by significant psychosocial stressors with her adult children and the fact that her grandchildren were recently moved out of state.  As a result, the patient agrees and expects to continue to benefit from treatment and would likely be at increased risk for decompensation.    Diagnoses and all orders for  this visit:    Generalized anxiety disorder    Major depressive disorder, recurrent episode, moderate               Mental Status Exam  Hygiene:  good  Dress:  casual  Attitude:  Cooperative  Motor Activity:  Appropriate  Speech:  Normal  Mood:  depressed  Affect:  depressed  Thought Processes:  Linear  Thought Content:  normal  Suicidal Thoughts:  denies  Homicidal Thoughts:  denies  Crisis Safety Plan: yes, to come to the emergency room.  Hallucinations:  denies    Patient's Support Network Includes:  extended family    Progress toward goal: Not at goal    Functional Status: Moderate impairment     Prognosis: Fair with Ongoing Treatment     Plan         Patient will continue in individual outpatient psychotherapy session at the [facility] monthly and pharmacotherapy as scheduled with Dr. Wasserman.  Patient will adhere to medication regimen as prescribed and report any side effects. Patient will contact this office, call 911 or present to the nearest emergency room should suicidal or homicidal ideations occur. Provide Cognitive Behavioral Therapy and Integrative Therapy to improve functioning, maintain stability, and avoid decompensation and the need for higher level of care.          Return in about 4 weeks (around 6/20/2018) for Next scheduled follow up.      This document signed by Jovany Ellis LCSW, ZAHIDA May 23, 2018 3:50 PM

## 2018-07-26 RX ORDER — VENLAFAXINE HYDROCHLORIDE 150 MG/1
CAPSULE, EXTENDED RELEASE ORAL
Qty: 180 CAPSULE | Refills: 0 | Status: SHIPPED | OUTPATIENT
Start: 2018-07-26 | End: 2018-11-07 | Stop reason: SDUPTHER

## 2018-07-26 RX ORDER — BUSPIRONE HYDROCHLORIDE 30 MG/1
30 TABLET ORAL 2 TIMES DAILY
Qty: 180 TABLET | Refills: 0 | Status: SHIPPED | OUTPATIENT
Start: 2018-07-26 | End: 2018-11-07 | Stop reason: SDUPTHER

## 2018-08-07 ENCOUNTER — OFFICE VISIT (OUTPATIENT)
Dept: PSYCHIATRY | Facility: CLINIC | Age: 48
End: 2018-08-07

## 2018-08-07 VITALS
WEIGHT: 144.8 LBS | HEIGHT: 66 IN | BODY MASS INDEX: 23.27 KG/M2 | DIASTOLIC BLOOD PRESSURE: 86 MMHG | SYSTOLIC BLOOD PRESSURE: 128 MMHG | HEART RATE: 76 BPM

## 2018-08-07 DIAGNOSIS — F41.1 GENERALIZED ANXIETY DISORDER: Primary | ICD-10-CM

## 2018-08-07 DIAGNOSIS — F33.1 MODERATE EPISODE OF RECURRENT MAJOR DEPRESSIVE DISORDER (HCC): ICD-10-CM

## 2018-08-07 PROCEDURE — 99213 OFFICE O/P EST LOW 20 MIN: CPT | Performed by: NURSE PRACTITIONER

## 2018-08-07 NOTE — PROGRESS NOTES
"      Subjective   Sangeeta Aguilar is a 47 y.o. female is here today for medication management follow-up.    Chief Complaint:  \"I am doing OK\"    History of Present Illness:    Currently rates depression a 9/10, with 10 being worse.  Anxiety rates it a 5/10.  Has any suicidal thoughts, homicidal thoughts, or any AV hallucinations.  She has been more down because her grandchildren had moved to [place].  She has went to visit them and stayed with him for 2 weeks and her nerves were calmer because she saw that her ex-daughter-in-law was taking better care of the children than she thought.  Since Easter month.  Her depression comes and goes and it being and 9 urinalysis nothing new and she does not feel she needs any medication changes.  She is taking some Atarax as needed 2 times a day per her PCP for anxiety and this is helping as well.  Not having any negative side effects to the medication.Body mass index is 23.37 kg/m².  Shows a white loss of 15 pounds since April.  I discussed this with patient and patient states that she is trying to lose weight and has started watching her calories that she felt like she was getting too heavy.  At least 5 hours a night.  States that with her depression being up she just feels like she needs to see Jovany North and this helps and she does have an appointment scheduled.  No new medical stressors.  He is even considering moving to [place] at some point.  The following portions of the patient's history were reviewed and updated as appropriate: allergies, current medications, past family history, past medical history, past social history, past surgical history and problem list.    Review of Systems   Constitutional: Negative for activity change, appetite change and fatigue.   HENT: Negative.    Eyes: Negative for visual disturbance.   Respiratory: Negative.    Cardiovascular: Negative.    Gastrointestinal: Negative for nausea.   Endocrine: Negative.    Genitourinary: Negative.  " "  Musculoskeletal: Negative for arthralgias.   Skin: Negative.    Allergic/Immunologic: Negative.    Neurological: Negative for dizziness, seizures and headaches.   Hematological: Negative.    Psychiatric/Behavioral: Negative for agitation, behavioral problems, confusion, decreased concentration, dysphoric mood, hallucinations, self-injury, sleep disturbance and suicidal ideas. The patient is not nervous/anxious and is not hyperactive.        Objective   Physical Exam   Constitutional: She appears well-developed and well-nourished.   Psychiatric: She has a normal mood and affect. Her speech is normal and behavior is normal. Judgment and thought content normal. Cognition and memory are normal.   Vitals reviewed.    Blood pressure 128/86, pulse 76, height 167.6 cm (66\"), weight 65.7 kg (144 lb 12.8 oz).    Medication List:   Current Outpatient Prescriptions   Medication Sig Dispense Refill   • atenolol (TENORMIN) 25 MG tablet      • atenolol (TENORMIN) 50 MG tablet      • ATROVENT HFA 17 MCG/ACT inhaler      • betamethasone dipropionate (DIPROLENE) 0.05 % cream      • busPIRone (BUSPAR) 30 MG tablet Take 1 tablet by mouth 2 (Two) Times a Day. 180 tablet 0   • DUPIXENT 300 MG/2ML solution prefilled syringe      • gabapentin (NEURONTIN) 600 MG tablet      • HYDROcodone-acetaminophen (NORCO)  MG per tablet      • hydroxychloroquine (PLAQUENIL) 200 MG tablet      • levocetirizine (XYZAL) 5 MG tablet      • omeprazole (priLOSEC) 40 MG capsule      • raNITIdine (ZANTAC) 300 MG tablet      • tiZANidine (ZANAFLEX) 4 MG tablet Take 4 mg by mouth At Night As Needed for Muscle Spasms.     • triamcinolone (KENALOG) 0.1 % cream      • venlafaxine XR (EFFEXOR XR) 150 MG 24 hr capsule Take 2 capsules by mouth daily 180 capsule 0     No current facility-administered medications for this visit.        Mental Status Exam:   Hygiene:   good  Cooperation:  Cooperative  Eye Contact:  Good  Psychomotor Behavior:  Appropriate  Affect: "  Full range  Hopelessness: Denies  Speech:  Normal  Thought Process:  Goal directed  Thought Content:  Normal  Suicidal:  None  Homicidal:  None  Hallucinations:  None  Delusion:  None  Memory:  Intact  Orientation:  Person, Place, Time and Situation  Reliability:  good  Insight:  Good  Judgement:  Good  Impulse Control:  Good  Physical/Medical Issues:  No     Assessment/Plan   Problems Addressed this Visit     None      Visit Diagnoses     Generalized anxiety disorder    -  Primary    Moderate episode of recurrent major depressive disorder (CMS/HCC)              Functionality: pt having minimal impairment in important areas of daily functioning.  at this time patient does not want to make any medication changes and I concur with this.  Willl continue the BuSpar and Effexor at the present dose.  She states that she will be sure and make her appointment with Jovany Ellis.  I'll have her back in 3 months but she is to notify me if anything were to change and I can hopefully see her sooner if that is warranted.  Prognosis:  good dependent on medication/follow up and treatment plan compliance.    .  Patient is agreeable to call the [facility].  Patient is aware to call 911 or go to the nearest ER should begin having SI/HI.

## 2018-08-14 ENCOUNTER — OFFICE VISIT (OUTPATIENT)
Dept: PSYCHIATRY | Facility: CLINIC | Age: 48
End: 2018-08-14

## 2018-08-14 DIAGNOSIS — F41.1 GENERALIZED ANXIETY DISORDER: ICD-10-CM

## 2018-08-14 DIAGNOSIS — F33.1 MAJOR DEPRESSIVE DISORDER, RECURRENT EPISODE, MODERATE (HCC): Primary | ICD-10-CM

## 2018-08-14 PROCEDURE — 90832 PSYTX W PT 30 MINUTES: CPT | Performed by: SOCIAL WORKER

## 2018-08-14 NOTE — PROGRESS NOTES
Date of Service: August 14, 2018  Time In: 12:10 PM  Time Out: 12:45 PM      PROGRESS NOTE  Data:  Sangeeta Aguilar is a 47 y.o. female who met with the undersigned for a regularly scheduled individual outpatient psychotherapy session at  the [facility] for follow-up of depression and anxiety.     HPI: Patient reports moderate increase in depression as evidenced by depressed mood, anhedonia, anergia, periods of hopelessness, difficulty concentrating, and periods of social isolation.  Patient rates current symptoms of depression at 8 on a scale 1-10 with 10 being most severe.  Patient also continues to struggle with anxiety including anxious mood, feeling on edge, feeling overwhelmed, increased heart rate, muscle tension, and urged to escape the situation and a sense of impending doom.  Patient rates current symptoms of anxiety at 7 on scale 1-10 with 10 being most severe.   Patient reports she continues to struggle with the fact that her grandchildren are 10 hours a way and states she is struggling with a decision as to whether or not she should move to [place] to be closer to her grandchildren and 2 of her adult children.  Patient reports she continues to adhere to medication regimen as prescribed.  Patient adamantly and convincingly denies suicidal ideation vehemently denies any substance use.      Clinical Maneuvering/Intervention:  Assisted patient in processing above session content; acknowledged and normalized patient’s thoughts, feelings, and concerns.  Assisted patient in discussing/processing her feelings and concerns concerning whether she should move to [place] and encouraged her to make a pros and cons list.  Also assisted patient in identifying positive aspects of her life and encouraged her to remind herself she has the ability to choose whether she focuses on the positive or negative.  Further encouraged the patient to actively seek enjoyable activities she can engage in a regular basis.  Provided  unconditional positive regard in a safe, supportive environment.     Allowed patient to freely discuss issues without interruption or judgment. Provided safe, confidential environment to facilitate the development of positive therapeutic relationship and encourage open, honest communication. Assisted patient in identifying risk factors which would indicate the need for higher level of care including thoughts to harm self or others and/or self-harming behavior and encouraged patient to contact this office, call 911, or present to the nearest emergency room should any of these events occur. Discussed crisis intervention services and means to access.  Patient adamantly and convincingly denies current suicidal or homicidal ideation or perceptual disturbance.    Assessment    Patient continues to struggle with  depression and anxiety which continue to be exacerbated by significant psychosocial stressors with her adult children and the fact that her grandchildren were recently moved out of state.  As a result, the patient agrees and expects to continue to benefit from treatment and would likely be at increased risk for decompensation.    Diagnoses and all orders for this visit:    Major depressive disorder, recurrent episode, moderate (CMS/HCC)    Generalized anxiety disorder               Mental Status Exam  Hygiene:  good  Dress:  casual  Attitude:  Cooperative  Motor Activity:  Appropriate  Speech:  Normal  Mood:  depressed  Affect:  depressed  Thought Processes:  Linear  Thought Content:  normal  Suicidal Thoughts:  denies  Homicidal Thoughts:  denies  Crisis Safety Plan: yes, to come to the emergency room.  Hallucinations:  denies    Patient's Support Network Includes:  extended family    Progress toward goal: Not at goal    Functional Status: Moderate impairment     Prognosis: Fair with Ongoing Treatment     Plan         Patient will continue in individual outpatient psychotherapy session at the [facility]  monthly and pharmacotherapy as scheduled with YURIY Morton.  Patient will adhere to medication regimen as prescribed and report any side effects. Patient will contact this office, call 911 or present to the nearest emergency room should suicidal or homicidal ideations occur. Provide Cognitive Behavioral Therapy and Integrative Therapy to improve functioning, maintain stability, and avoid decompensation and the need for higher level of care.          Return in about 4 weeks (around 9/11/2018) for Next scheduled follow up.      This document signed by Jovany Ellis LCSW, [facility] August 14, 2018 2:12 PM

## 2018-10-03 ENCOUNTER — OFFICE VISIT (OUTPATIENT)
Dept: PSYCHIATRY | Facility: CLINIC | Age: 48
End: 2018-10-03

## 2018-10-03 DIAGNOSIS — F33.1 MAJOR DEPRESSIVE DISORDER, RECURRENT EPISODE, MODERATE (HCC): Primary | ICD-10-CM

## 2018-10-03 DIAGNOSIS — F41.1 GENERALIZED ANXIETY DISORDER: ICD-10-CM

## 2018-10-03 PROCEDURE — 90834 PSYTX W PT 45 MINUTES: CPT | Performed by: SOCIAL WORKER

## 2018-10-03 NOTE — PROGRESS NOTES
Date of Service: October 3, 2018  Time In: 1:15 PM  Time Out: 1:57 PM      PROGRESS NOTE  Data:  Sangeeta Aguilar is a 48 y.o. female who met with the undersigned for a regularly scheduled individual outpatient psychotherapy session at  the [facility] for follow-up of depression and anxiety.     HPI: Patient reports her youngest son was recently discharged from her treatment program and states he is facing 3 years in group home.  She reports this is caused significant anxiety and worry but states she is also angry at him.  Patient reports she and her estranged  are planning to take a trip to [place] this week to see her daughter, son, and grandchildren.  Patient also reports she recently had what she saw was stroke like symptoms but states the emergency room physician informed her they believe it was due to a migraine.  Patient reports ongoing depression as evidenced by depressed mood, anhedonia, anergia, periods of hopelessness, difficulty concentrating, and periods of social isolation.  Patient rates current symptoms of depression at 6 on a scale 1-10 with 10 being most severe.  Patient also continues to struggle with anxiety including anxious mood, feeling on edge, feeling overwhelmed, increased heart rate, muscle tension, and urged to escape the situation and a sense of impending doom.  Patient rates current symptoms of anxiety at 6/7 on scale 1-10 with 10 being most severe.     Patient reports she continues to adhere to medication regimen as prescribed.  Patient adamantly and convincingly denies suicidal ideation vehemently denies any substance use.      Clinical Maneuvering/Intervention:  Assisted patient in processing above session content; acknowledged and normalized patient’s thoughts, feelings, and concerns.  Allow the patient to discuss/vent her feelings of frustration concerning her youngest son and validated her feelings.  Also discussed the concept of things we can't change things we cannot  change and encourage the patient to accept the fact he is an adult and will make his own decisions and suffering the consequences.  Also strongly urged the patient to follow-up with appropriate treatment following her recent emergency room visit.  Continued to discuss appropriate healthy skills of daily living including eating a healthy diet, getting regular physical activity, and getting adequate sleep.  Provided unconditional positive regard in a safe, supportive environment.     Allowed patient to freely discuss issues without interruption or judgment. Provided safe, confidential environment to facilitate the development of positive therapeutic relationship and encourage open, honest communication. Assisted patient in identifying risk factors which would indicate the need for higher level of care including thoughts to harm self or others and/or self-harming behavior and encouraged patient to contact this office, call 911, or present to the nearest emergency room should any of these events occur. Discussed crisis intervention services and means to access.  Patient adamantly and convincingly denies current suicidal or homicidal ideation or perceptual disturbance.    Assessment    Patient continues to struggle with  depression and anxiety which continue to be exacerbated by significant psychosocial stressors.  As a result, the patient can be reasonably expected to continue to benefit from treatment and would likely be at increased risk for decompensation.    Diagnoses and all orders for this visit:    Major depressive disorder, recurrent episode, moderate (CMS/HCC)    Generalized anxiety disorder               Mental Status Exam  Hygiene:  good  Dress:  casual  Attitude:  Cooperative  Motor Activity:  Appropriate  Speech:  Normal  Mood:  depressed  Affect:  depressed  Thought Processes:  Linear  Thought Content:  normal  Suicidal Thoughts:  denies  Homicidal Thoughts:  denies  Crisis Safety Plan: yes, to come to the  emergency room.  Hallucinations:  denies    Patient's Support Network Includes:  extended family    Progress toward goal: Not at goal    Functional Status: Moderate impairment     Prognosis: Fair with Ongoing Treatment     Plan         Patient will continue in individual outpatient psychotherapy session at the [facility] monthly and pharmacotherapy as scheduled with YURIY Morton.  Patient will adhere to medication regimen as prescribed and report any side effects. Patient will contact this office, call 911 or present to the nearest emergency room should suicidal or homicidal ideations occur. Provide Cognitive Behavioral Therapy and Integrative Therapy to improve functioning, maintain stability, and avoid decompensation and the need for higher level of care.          Return in about 4 weeks (around 10/31/2018).      This document signed by Jovany Ellis LCSW, ZAHIDA October 3, 2018 4:15 PM

## 2018-11-07 ENCOUNTER — OFFICE VISIT (OUTPATIENT)
Dept: PSYCHIATRY | Facility: CLINIC | Age: 48
End: 2018-11-07

## 2018-11-07 VITALS
BODY MASS INDEX: 22.13 KG/M2 | HEART RATE: 74 BPM | WEIGHT: 137.7 LBS | HEIGHT: 66 IN | SYSTOLIC BLOOD PRESSURE: 131 MMHG | DIASTOLIC BLOOD PRESSURE: 87 MMHG

## 2018-11-07 DIAGNOSIS — F41.1 GENERALIZED ANXIETY DISORDER: ICD-10-CM

## 2018-11-07 DIAGNOSIS — F33.1 MAJOR DEPRESSIVE DISORDER, RECURRENT EPISODE, MODERATE (HCC): Primary | ICD-10-CM

## 2018-11-07 PROCEDURE — 99213 OFFICE O/P EST LOW 20 MIN: CPT | Performed by: NURSE PRACTITIONER

## 2018-11-07 RX ORDER — VENLAFAXINE HYDROCHLORIDE 150 MG/1
CAPSULE, EXTENDED RELEASE ORAL
Qty: 180 CAPSULE | Refills: 2 | Status: SHIPPED | OUTPATIENT
Start: 2018-11-07 | End: 2019-02-25 | Stop reason: DRUGHIGH

## 2018-11-07 RX ORDER — BUSPIRONE HYDROCHLORIDE 30 MG/1
30 TABLET ORAL 2 TIMES DAILY
Qty: 180 TABLET | Refills: 2 | Status: SHIPPED | OUTPATIENT
Start: 2018-11-07 | End: 2019-04-22 | Stop reason: SDUPTHER

## 2018-11-07 NOTE — PROGRESS NOTES
Subjective   Sangeeta Aguilar is a 48 y.o. female is here today for medication management follow-up.    Chief Complaint: recheck on depression    History of Present Illness:  Pt states that a recent stressor is that her son has been placed in residential.  She is dealing as best as she can.  Currently rates depression a 1/10 with 10 being worse.  Rates anxiety a 5/10.  Sleeping aprox 5 hours a night.  Body mass index is 22.23 kg/m². No appetite changes. Shows weight loss of 6 lbs since last office visit 3 months ago.  Denies any suicidal thoughts, homicidal thoughts, or any a/V hallucinations.  No new medical stressors. Irritability is stable.  No anger outbursts.  No panic attacks.  Pt is pleased with her medication regimen.  States she does not need anything to help her sleep.       The following portions of the patient's history were reviewed and updated as appropriate: allergies, current medications, past family history, past medical history, past social history, past surgical history and problem list.    Review of Systems   Constitutional: Negative for activity change, appetite change and fatigue.   HENT: Negative.    Eyes: Negative for visual disturbance.   Respiratory: Negative.    Cardiovascular: Negative.    Gastrointestinal: Negative for nausea.   Endocrine: Negative.    Genitourinary: Negative.    Musculoskeletal: Negative for arthralgias.   Skin: Negative.    Allergic/Immunologic: Negative.    Neurological: Negative for dizziness, seizures and headaches.   Hematological: Negative.    Psychiatric/Behavioral: Negative for agitation, behavioral problems, confusion, decreased concentration, dysphoric mood, hallucinations, self-injury, sleep disturbance and suicidal ideas. The patient is not nervous/anxious and is not hyperactive.        Objective   Physical Exam   Constitutional: She appears well-developed and well-nourished.   Psychiatric: She has a normal mood and affect. Her speech is normal and behavior is  "normal. Judgment and thought content normal. Cognition and memory are normal.   Vitals reviewed.    Blood pressure 131/87, pulse 74, height 167.6 cm (66\"), weight 62.5 kg (137 lb 11.2 oz).    Medication List:   Current Outpatient Prescriptions   Medication Sig Dispense Refill   • atenolol (TENORMIN) 25 MG tablet      • atenolol (TENORMIN) 50 MG tablet      • ATROVENT HFA 17 MCG/ACT inhaler      • betamethasone dipropionate (DIPROLENE) 0.05 % cream      • busPIRone (BUSPAR) 30 MG tablet Take 1 tablet by mouth 2 (Two) Times a Day. 180 tablet 2   • DUPIXENT 300 MG/2ML solution prefilled syringe      • gabapentin (NEURONTIN) 600 MG tablet      • HYDROcodone-acetaminophen (NORCO)  MG per tablet      • hydroxychloroquine (PLAQUENIL) 200 MG tablet      • levocetirizine (XYZAL) 5 MG tablet      • omeprazole (priLOSEC) 40 MG capsule      • raNITIdine (ZANTAC) 300 MG tablet      • tiZANidine (ZANAFLEX) 4 MG tablet Take 4 mg by mouth At Night As Needed for Muscle Spasms.     • triamcinolone (KENALOG) 0.1 % cream      • venlafaxine XR (EFFEXOR XR) 150 MG 24 hr capsule Take 2 capsules by mouth daily 180 capsule 2     No current facility-administered medications for this visit.        Mental Status Exam:   Hygiene:   good  Cooperation:  Cooperative  Eye Contact:  Good  Psychomotor Behavior:  Appropriate  Affect:  Full range  Hopelessness: Denies  Speech:  Normal  Thought Process:  Goal directed  Thought Content:  Normal  Suicidal:  None  Homicidal:  None  Hallucinations:  None  Delusion:  None  Memory:  Intact  Orientation:  Person, Place, Time and Situation  Reliability:  good  Insight:  Good  Judgement:  Good  Impulse Control:  Good  Physical/Medical Issues:  No     Assessment/Plan   Problems Addressed this Visit     None      Visit Diagnoses     Major depressive disorder, recurrent episode, moderate (CMS/HCC)    -  Primary    Relevant Medications    busPIRone (BUSPAR) 30 MG tablet    venlafaxine XR (EFFEXOR XR) 150 MG 24 " hr capsule    Generalized anxiety disorder        Relevant Medications    busPIRone (BUSPAR) 30 MG tablet    venlafaxine XR (EFFEXOR XR) 150 MG 24 hr capsule          Functionality: pt having minimal impairment in important areas of daily functioning.  Prognosis:  good dependent on medication/follow up and treatment plan compliance.  at this time patient does not want to make any medication changes and I concur with this.  Willl continue the BuSpar and Effexor at the present dose.  She is to continue therapy with Jovany Ellis.  I'll have her back in 3 months but she is to notify me if anything were to change and I can hopefully see her sooner if that is warranted.       .  Patient is agreeable to call the [facility].  Patient is aware to call 911 or go to the nearest ER should begin having SI/HI.

## 2019-02-25 ENCOUNTER — OFFICE VISIT (OUTPATIENT)
Dept: PSYCHIATRY | Facility: CLINIC | Age: 49
End: 2019-02-25

## 2019-02-25 VITALS
HEART RATE: 77 BPM | HEIGHT: 66 IN | DIASTOLIC BLOOD PRESSURE: 84 MMHG | SYSTOLIC BLOOD PRESSURE: 134 MMHG | WEIGHT: 138.2 LBS | BODY MASS INDEX: 22.21 KG/M2

## 2019-02-25 DIAGNOSIS — F41.1 GENERALIZED ANXIETY DISORDER: Primary | ICD-10-CM

## 2019-02-25 DIAGNOSIS — F33.3 SEVERE EPISODE OF RECURRENT MAJOR DEPRESSIVE DISORDER, WITH PSYCHOTIC FEATURES (HCC): ICD-10-CM

## 2019-02-25 PROCEDURE — 99214 OFFICE O/P EST MOD 30 MIN: CPT | Performed by: NURSE PRACTITIONER

## 2019-02-25 RX ORDER — VENLAFAXINE HYDROCHLORIDE 150 MG/1
CAPSULE, EXTENDED RELEASE ORAL
Qty: 1 CAPSULE | Refills: 2 | Status: SHIPPED | OUTPATIENT
Start: 2019-02-25 | End: 2019-04-08 | Stop reason: SDUPTHER

## 2019-02-25 RX ORDER — ARIPIPRAZOLE 5 MG/1
2.5 TABLET ORAL DAILY
Qty: 15 TABLET | Refills: 0 | Status: SHIPPED | OUTPATIENT
Start: 2019-02-25 | End: 2019-04-22

## 2019-02-25 RX ORDER — VENLAFAXINE HYDROCHLORIDE 75 MG/1
75 CAPSULE, EXTENDED RELEASE ORAL DAILY
Qty: 30 CAPSULE | Refills: 0 | Status: SHIPPED | OUTPATIENT
Start: 2019-02-25 | End: 2019-04-08 | Stop reason: SDUPTHER

## 2019-02-25 NOTE — PROGRESS NOTES
"      Subjective   Sangeeta Aguilar is a 48 y.o. female is here today for medication management follow-up.    Chief Complaint: recheck on depression    History of Present Illness:  Pt presents for follow up at the [facility].  She states after the holidays her depression has gotten worse.  She is depressed over her grandkids living in [place].  Her son is now in long term rehab.  She really does not know the exact trigger just feels more sad and crying more. She states her sleep is currently bad.  States \"my mind just will not shut down\". States she gets 6 hours sleep on a good night and 4 on a bad night.  Body mass index is 22.31 kg/m². no appetite changes.  No current medical stressors. Pt currently rates depression a 8/10 with 10 being worse.  Rates anxiety a 8/10.  Denies any suicidal thoughts, homicidal thoughts, denies any auditory hallucinations.  She states for the past month she has seen a \"bug\" on her that is not there and also seeing some shadows.  This is the first time this has ever happened. Has not had any fever or any signs of infection other than some recent diarrhea.  Has had aprox 3 panic attacks since last visit.  Remembers one time the trigger was driving and she really does not know why. Pt states she cannot take seroquel.  Denies any juan symptoms.  No anger outbursts.  No current negative side effects from the medication.            The following portions of the patient's history were reviewed and updated as appropriate: allergies, current medications, past family history, past medical history, past social history, past surgical history and problem list.    Review of Systems   Constitutional: Negative for activity change, appetite change and fatigue.   HENT: Negative.    Eyes: Negative for visual disturbance.   Respiratory: Negative.    Cardiovascular: Negative.    Gastrointestinal: Negative for nausea.   Endocrine: Negative.    Genitourinary: Negative.    Musculoskeletal: " "Negative for arthralgias.   Skin: Negative.    Allergic/Immunologic: Negative.    Neurological: Negative for dizziness, seizures and headaches.   Hematological: Negative.    Psychiatric/Behavioral: Positive for agitation and sleep disturbance. Negative for behavioral problems, confusion, decreased concentration, dysphoric mood, hallucinations, self-injury and suicidal ideas. The patient is nervous/anxious. The patient is not hyperactive.        Objective   Physical Exam   Constitutional: She appears well-developed and well-nourished.   Psychiatric: She has a normal mood and affect. Her speech is normal and behavior is normal. Judgment and thought content normal. Cognition and memory are normal.   Vitals reviewed.    Blood pressure 134/84, pulse 77, height 167.6 cm (66\"), weight 62.7 kg (138 lb 3.2 oz).    Medication List:   Current Outpatient Medications   Medication Sig Dispense Refill   • ARIPiprazole (ABILIFY) 5 MG tablet Take 0.5 tablets by mouth Daily. 15 tablet 0   • atenolol (TENORMIN) 25 MG tablet      • atenolol (TENORMIN) 50 MG tablet      • ATROVENT HFA 17 MCG/ACT inhaler      • betamethasone dipropionate (DIPROLENE) 0.05 % cream      • busPIRone (BUSPAR) 30 MG tablet Take 1 tablet by mouth 2 (Two) Times a Day. 180 tablet 2   • DUPIXENT 300 MG/2ML solution prefilled syringe      • gabapentin (NEURONTIN) 600 MG tablet      • HYDROcodone-acetaminophen (NORCO)  MG per tablet      • hydroxychloroquine (PLAQUENIL) 200 MG tablet      • levocetirizine (XYZAL) 5 MG tablet      • omeprazole (priLOSEC) 40 MG capsule      • raNITIdine (ZANTAC) 300 MG tablet      • tiZANidine (ZANAFLEX) 4 MG tablet Take 4 mg by mouth At Night As Needed for Muscle Spasms.     • triamcinolone (KENALOG) 0.1 % cream      • venlafaxine XR (EFFEXOR XR) 150 MG 24 hr capsule Take 1 tablet daily along with Effexor Xr 75mg for total of 225mg 1 capsule 2   • venlafaxine XR (EFFEXOR-XR) 75 MG 24 hr capsule Take 1 capsule by mouth Daily. " Take along with effexor xr 150mg for total of 225mg a day 30 capsule 0     No current facility-administered medications for this visit.        Mental Status Exam:   Hygiene:   good  Cooperation:  Cooperative  Eye Contact:  Good  Psychomotor Behavior:  Appropriate  Affect:  Full range  Hopelessness: Denies  Speech:  Normal  Thought Process:  Goal directed  Thought Content:  Normal  Suicidal:  None  Homicidal:  None  Hallucinations:  None  Delusion:  None  Memory:  Intact  Orientation:  Person, Place, Time and Situation  Reliability:  good  Insight:  Good  Judgement:  Good  Impulse Control:  Good  Physical/Medical Issues:  No     Assessment/Plan   Problems Addressed this Visit     None      Visit Diagnoses     Generalized anxiety disorder    -  Primary    Relevant Medications    ARIPiprazole (ABILIFY) 5 MG tablet    venlafaxine XR (EFFEXOR-XR) 75 MG 24 hr capsule    venlafaxine XR (EFFEXOR XR) 150 MG 24 hr capsule    Severe episode of recurrent major depressive disorder, with psychotic features (CMS/HCC)        Relevant Medications    ARIPiprazole (ABILIFY) 5 MG tablet    venlafaxine XR (EFFEXOR-XR) 75 MG 24 hr capsule    venlafaxine XR (EFFEXOR XR) 150 MG 24 hr capsule          Functionality: pt having moderate impairment in important areas of daily functioning.  Prognosis:  guarded dependent on medication/follow up and treatment plan compliance.  I have had pt sign a release of records for her labwork performed at her rheumatologist office.  I am beginning abilify low does as depression adjunct as well as for the psychosis.  Lengthy discussion with patient on the possible side effects of antipsychotic medications including increased cholesterol, increased blood sugar, and possibility of weight gain.  Also discussed the need to monitor lab work associated with this.  The risk of muscle movement disorders with this class of medication was also discussed. She is in agreement with this plan.  She is to continue the  effexor and the buspar.  Continuing efforts to promote the therapeutic alliance, address the patient's issues, and strengthen self awareness, insights, and coping skills.  She has not been compliant with her therapy appointments and this was strongly encouraged. .  Patient is agreeable to call the [facility].  Patient is aware to call 911 or go to the nearest ER should begin having SI/HI. RTC 2 weeks.

## 2019-03-01 ENCOUNTER — TELEPHONE (OUTPATIENT)
Dept: PSYCHIATRY | Facility: CLINIC | Age: 49
End: 2019-03-01

## 2019-03-01 NOTE — TELEPHONE ENCOUNTER
Pt called wanting to make you aware that she wasn't going to start the new medication right now, do you still want her to come back in 2weeks ?

## 2019-03-01 NOTE — TELEPHONE ENCOUNTER
If she is not starting it she does not have to come back in 2 weeks unless she just wants to since her depression was worse at last visit.

## 2019-04-05 DIAGNOSIS — F33.3 SEVERE EPISODE OF RECURRENT MAJOR DEPRESSIVE DISORDER, WITH PSYCHOTIC FEATURES (HCC): ICD-10-CM

## 2019-04-05 RX ORDER — VENLAFAXINE HYDROCHLORIDE 75 MG/1
CAPSULE, EXTENDED RELEASE ORAL
Qty: 30 CAPSULE | Refills: 0 | Status: CANCELLED | OUTPATIENT
Start: 2019-04-05

## 2019-04-08 ENCOUNTER — TELEPHONE (OUTPATIENT)
Dept: PSYCHIATRY | Facility: CLINIC | Age: 49
End: 2019-04-08

## 2019-04-08 DIAGNOSIS — F33.3 SEVERE EPISODE OF RECURRENT MAJOR DEPRESSIVE DISORDER, WITH PSYCHOTIC FEATURES (HCC): ICD-10-CM

## 2019-04-08 DIAGNOSIS — F41.1 GENERALIZED ANXIETY DISORDER: ICD-10-CM

## 2019-04-08 RX ORDER — VENLAFAXINE HYDROCHLORIDE 75 MG/1
75 CAPSULE, EXTENDED RELEASE ORAL DAILY
Qty: 30 CAPSULE | Refills: 0 | Status: SHIPPED | OUTPATIENT
Start: 2019-04-08 | End: 2019-04-22 | Stop reason: SDUPTHER

## 2019-04-08 RX ORDER — VENLAFAXINE HYDROCHLORIDE 150 MG/1
CAPSULE, EXTENDED RELEASE ORAL
Qty: 1 CAPSULE | Refills: 0 | Status: SHIPPED | OUTPATIENT
Start: 2019-04-08 | End: 2019-08-05 | Stop reason: SDUPTHER

## 2019-04-22 ENCOUNTER — OFFICE VISIT (OUTPATIENT)
Dept: PSYCHIATRY | Facility: CLINIC | Age: 49
End: 2019-04-22

## 2019-04-22 VITALS
BODY MASS INDEX: 22.5 KG/M2 | WEIGHT: 140 LBS | SYSTOLIC BLOOD PRESSURE: 131 MMHG | DIASTOLIC BLOOD PRESSURE: 85 MMHG | HEIGHT: 66 IN | HEART RATE: 71 BPM

## 2019-04-22 DIAGNOSIS — F41.1 GENERALIZED ANXIETY DISORDER: ICD-10-CM

## 2019-04-22 DIAGNOSIS — F33.3 SEVERE EPISODE OF RECURRENT MAJOR DEPRESSIVE DISORDER, WITH PSYCHOTIC FEATURES (HCC): Primary | ICD-10-CM

## 2019-04-22 PROCEDURE — 99214 OFFICE O/P EST MOD 30 MIN: CPT | Performed by: NURSE PRACTITIONER

## 2019-04-22 RX ORDER — VENLAFAXINE HYDROCHLORIDE 75 MG/1
75 CAPSULE, EXTENDED RELEASE ORAL DAILY
Qty: 30 CAPSULE | Refills: 2 | Status: SHIPPED | OUTPATIENT
Start: 2019-04-22 | End: 2019-08-05 | Stop reason: SDUPTHER

## 2019-04-22 RX ORDER — BUSPIRONE HYDROCHLORIDE 30 MG/1
30 TABLET ORAL 2 TIMES DAILY
Qty: 180 TABLET | Refills: 2 | Status: SHIPPED | OUTPATIENT
Start: 2019-04-22 | End: 2019-08-05 | Stop reason: SDUPTHER

## 2019-04-22 NOTE — PROGRESS NOTES
"      Subjective   Sangeeta Aguilar is a 48 y.o. female is here today for medication management follow-up.    Chief Complaint: recheck on depression    History of Present Illness:  Pt presents for follow up at the [facility].  Pt currently rates depression a 2/10 with 10 being worse.  Rates anxiety a 2/10.  Denies any suicidal thoughts, homicidal thoughts, or any a/V hallucinations. She states she never started the abilify as she was \"afraid of it\".  Says currently she is doing better and thinks it is because she is getting the house more.  She has had a couple of panic attacks since the last visit and does not really know the trigger but states that she works through these.  Denies any medical stressors.  She does have headaches but has had these for years and has previously been on a higher dose of Effexor in the past and does not believe they are attributed to Effexor.  She states that she has some neck issues and believes it may be this.  She plans on discussing this with her primary care physician. Body mass index is 22.61 kg/m². no weight changes.  No negative side effects to the meds. Mood is stable.  No anger outbursts.          The following portions of the patient's history were reviewed and updated as appropriate: allergies, current medications, past family history, past medical history, past social history, past surgical history and problem list.    Review of Systems   Constitutional: Negative for activity change, appetite change and fatigue.   Eyes: Negative for visual disturbance.   Respiratory: Negative.    Cardiovascular: Negative.    Gastrointestinal: Negative for nausea.   Endocrine: Negative.    Genitourinary: Negative.    Musculoskeletal: Negative for arthralgias.   Skin: Negative.    Allergic/Immunologic: Negative.    Neurological: Positive for headaches. Negative for dizziness and seizures.   Hematological: Negative.    Psychiatric/Behavioral: Positive for sleep disturbance. " "Negative for agitation, behavioral problems, confusion, decreased concentration, dysphoric mood, hallucinations, self-injury and suicidal ideas. The patient is not nervous/anxious and is not hyperactive.        Objective   Physical Exam   Constitutional: She is oriented to person, place, and time. She appears well-developed and well-nourished.   Neck: Normal range of motion.   Neurological: She is alert and oriented to person, place, and time.   Psychiatric: She has a normal mood and affect. Her speech is normal and behavior is normal. Judgment and thought content normal. Cognition and memory are normal.   Pleasant and cooperative   Vitals reviewed.    Blood pressure 131/85, pulse 71, height 167.6 cm (65.98\"), weight 63.5 kg (140 lb).    Medication List:   Current Outpatient Medications   Medication Sig Dispense Refill   • atenolol (TENORMIN) 25 MG tablet      • atenolol (TENORMIN) 50 MG tablet      • ATROVENT HFA 17 MCG/ACT inhaler      • betamethasone dipropionate (DIPROLENE) 0.05 % cream      • busPIRone (BUSPAR) 30 MG tablet Take 1 tablet by mouth 2 (Two) Times a Day. 180 tablet 2   • DUPIXENT 300 MG/2ML solution prefilled syringe      • gabapentin (NEURONTIN) 600 MG tablet      • HYDROcodone-acetaminophen (NORCO)  MG per tablet      • hydroxychloroquine (PLAQUENIL) 200 MG tablet      • levocetirizine (XYZAL) 5 MG tablet      • omeprazole (priLOSEC) 40 MG capsule      • raNITIdine (ZANTAC) 300 MG tablet      • tiZANidine (ZANAFLEX) 4 MG tablet Take 4 mg by mouth At Night As Needed for Muscle Spasms.     • triamcinolone (KENALOG) 0.1 % cream      • venlafaxine XR (EFFEXOR XR) 150 MG 24 hr capsule Take 1 tablet daily along with Effexor Xr 75mg for total of 225mg 1 capsule 0   • venlafaxine XR (EFFEXOR-XR) 75 MG 24 hr capsule Take 1 capsule by mouth Daily. Take along with effexor xr 150mg for total of 225mg a day 30 capsule 2     No current facility-administered medications for this visit.        Mental " Status Exam:   Hygiene:   good  Cooperation:  Cooperative  Eye Contact:  Good  Psychomotor Behavior:  Appropriate  Affect:  Full range  Hopelessness: Denies  Speech:  Normal  Thought Process:  Goal directed  Thought Content:  Normal  Suicidal:  None  Homicidal:  None  Hallucinations:  None  Delusion:  None  Memory:  Intact  Orientation:  Person, Place, Time and Situation  Reliability:  good  Insight:  Good  Judgement:  Good  Impulse Control:  Good  Physical/Medical Issues:  No     Assessment/Plan   Problems Addressed this Visit     None      Visit Diagnoses     Severe episode of recurrent major depressive disorder, with psychotic features (CMS/HCC)    -  Primary    Relevant Medications    venlafaxine XR (EFFEXOR-XR) 75 MG 24 hr capsule    busPIRone (BUSPAR) 30 MG tablet    Generalized anxiety disorder        Relevant Medications    venlafaxine XR (EFFEXOR-XR) 75 MG 24 hr capsule    busPIRone (BUSPAR) 30 MG tablet          Functionality: pt having moderate impairment in important areas of daily functioning.  Prognosis:  guarded dependent on medication/follow up and treatment plan compliance.    She is going to continue the buspar and the effexor.  She is to continue therapy with Jovany Ellis.  Continuing efforts to promote the therapeutic alliance, address the patient's issues, and strengthen self awareness, insights, and coping skills.  She has not been compliant with her therapy appointments and this was strongly encouraged. .  Patient is agreeable to call the [facility].  Patient is aware to call 911 or go to the nearest ER should begin having SI/HI. RTC 12 weeks.

## 2019-05-15 ENCOUNTER — OFFICE VISIT (OUTPATIENT)
Dept: PSYCHIATRY | Facility: CLINIC | Age: 49
End: 2019-05-15

## 2019-05-15 DIAGNOSIS — F33.1 MAJOR DEPRESSIVE DISORDER, RECURRENT EPISODE, MODERATE (HCC): Primary | ICD-10-CM

## 2019-05-15 DIAGNOSIS — F41.1 GENERALIZED ANXIETY DISORDER: ICD-10-CM

## 2019-05-15 PROCEDURE — 90834 PSYTX W PT 45 MINUTES: CPT | Performed by: SOCIAL WORKER

## 2019-05-15 NOTE — PROGRESS NOTES
Date of Service: May 15, 2019  Time In: 12:25 PM  Time Out: 1:10 PM      PROGRESS NOTE  Data:  Sangeeta Aguilar is a 48 y.o. female who met with the undersigned for a regularly scheduled individual outpatient psychotherapy session at  the [facility] for follow-up of depression and anxiety.     HPI: Patient reports her daughter-in-law and grandchildren continue to live in [place] in the same area as her son and daughter.  She also reports her youngest son continues to be in residential treatment for substance use and states he likely has at least 6-9 additional months before completing the program.  Patient reports she is struggling as she believes her daughter-in-law is not caring for HER-2 grandchildren adequately and states she has made several trips to [place] to check on him.  Patient reports she is always felt responsible for their well-being.  Patient reports ongoing depression as evidenced by depressed mood, anhedonia, anergia, periods of hopelessness, difficulty concentrating, and periods of social isolation.  Patient rates current symptoms of depression at 5 on a scale 1-10 with 10 being most severe.  Patient also continues to struggle with anxiety including anxious mood, feeling on edge, feeling overwhelmed, increased heart rate, muscle tension, and urged to escape the situation and a sense of impending doom.  Patient rates current symptoms of anxiety at 5 on scale 1-10 with 10 being most severe.     Patient reports she continues to adhere to medication regimen as prescribed.  Patient adamantly and convincingly denies suicidal ideation vehemently denies any substance use.      Clinical Maneuvering/Intervention:  Assisted patient in processing above session content; acknowledged and normalized patient’s thoughts, feelings, and concerns.  While the patient to discuss/vent her feelings and frustration with ongoing difficulties with her son, daughter-in-law, and grandchildren and validated her feelings.   Also praised the patient for her willingness to sacrifice to care for her grandchildren.  Also validated the patient's belief some of her daughter-in-law's actions could be tantamount to neglect and encouraged her to contact child protection if she feels this is the case.  Continue to utilize cognitive behavioral therapy to assist patient in developing appropriate coping mechanisms to decrease the severity and frequency of symptoms.  Also discussed the concept of things we can control things he cannot control and challenge the patient to remind herself she cannot control the decisions or behaviors of others.  Provided unconditional positive regard in a safe, supportive environment.     Also informed the patient the undersigned could be leaving this practice and informed her she would either receive a call to reschedule or a letter.    Allowed patient to freely discuss issues without interruption or judgment. Provided safe, confidential environment to facilitate the development of positive therapeutic relationship and encourage open, honest communication. Assisted patient in identifying risk factors which would indicate the need for higher level of care including thoughts to harm self or others and/or self-harming behavior and encouraged patient to contact this office, call 911, or present to the nearest emergency room should any of these events occur. Discussed crisis intervention services and means to access.  Patient adamantly and convincingly denies current suicidal or homicidal ideation or perceptual disturbance.    Assessment    Patient continues to struggle with  depression and anxiety which continue to be exacerbated by significant psychosocial stressors including ongoing difficulties with family members and her sense of responsibility for her grandchildren.  As a result, the patient can be reasonably expected to continue to benefit from treatment and would likely be at increased risk for  decompensation.    Diagnoses and all orders for this visit:    Major depressive disorder, recurrent episode, moderate (CMS/HCC)    Generalized anxiety disorder               Mental Status Exam  Hygiene:  good  Dress:  casual  Attitude:  Cooperative  Motor Activity:  Appropriate  Speech:  Normal  Mood:  depressed  Affect:  depressed  Thought Processes:  Linear  Thought Content:  normal  Suicidal Thoughts:  denies  Homicidal Thoughts:  denies  Crisis Safety Plan: yes, to come to the emergency room.  Hallucinations:  denies    Patient's Support Network Includes:  extended family    Progress toward goal: Not at goal    Functional Status: Moderate impairment     Prognosis: Fair with Ongoing Treatment     Plan         Patient will continue in pharmacotherapy as scheduled with YURIY Morton.  Patient will schedule follow-up with the undersigned in this office, seek treatment from a different provider, or seek treatment from the undersigned at [facility].  Patient will adhere to medication regimen as prescribed and report any side effects. Patient will contact this office, call 911 or present to the nearest emergency room should suicidal or homicidal ideations occur. Provide Cognitive Behavioral Therapy and Integrative Therapy to improve functioning, maintain stability, and avoid decompensation and the need for higher level of care.          No Follow-up on file.      This document signed by Jovany Ellis LCSW, [facility] May 15, 2019 3:44 PM

## 2019-07-24 ENCOUNTER — OFFICE VISIT (OUTPATIENT)
Dept: PSYCHIATRY | Facility: CLINIC | Age: 49
End: 2019-07-24

## 2019-07-24 DIAGNOSIS — F41.1 GENERALIZED ANXIETY DISORDER: ICD-10-CM

## 2019-07-24 DIAGNOSIS — F33.1 MAJOR DEPRESSIVE DISORDER, RECURRENT EPISODE, MODERATE (HCC): Primary | ICD-10-CM

## 2019-07-24 PROCEDURE — 90834 PSYTX W PT 45 MINUTES: CPT | Performed by: SOCIAL WORKER

## 2019-07-24 NOTE — PROGRESS NOTES
Date of Service: July 24, 2019  Time In: 3:00 PM  Time Out: 3:40 PM      PROGRESS NOTE  Data:  Sangetea Aguilar is a 48 y.o. female who met with the undersigned for a regularly scheduled individual outpatient psychotherapy session at  the [facility] for follow-up of depression and anxiety.  Patient brings her 4-year-old granddaughter to the session and states he has had her and her brother throughout the summer.  She reports she will be taking them back to [place] in the next couple of weeks.     HPI: Patient reports she believes HER-2 grandchildren are doing better since they have been with her throughout the summer and states they have gained weight.  She also reports her daughter-in-law does not have a place to live in [place] and is living between her friend and her boyfriend.  Patient reports she continues to feel responsible for the well-being of her grandchildren and states she is not sure what she will do when she returns with them to their mother.  Patient also reports her son continues to be in treatment and states although he appears to be doing very well she is suspicious as he has caused significant harm to the family throughout the years with his ongoing substance use.  Patient reports ongoing depression as evidenced by depressed mood, anhedonia, anergia, periods of hopelessness, difficulty concentrating, and periods of social isolation.  Patient rates current symptoms of depression at 5 on a scale 1-10 with 10 being most severe.  Patient also continues to struggle with anxiety including anxious mood, feeling on edge, feeling overwhelmed, increased heart rate, muscle tension, and urge to escape the situation and a sense of impending doom.  Patient rates current symptoms of anxiety at 5 on scale 1-10 with 10 being most severe.     Patient reports she continues to adhere to medication regimen as prescribed.  Patient adamantly and convincingly denies suicidal ideation vehemently denies any substance  use.      Clinical Maneuvering/Intervention:  Assisted patient in processing above session content; acknowledged and normalized patient’s thoughts, feelings, and concerns.  Allow the patient to discuss/vent her feelings and frustrations ongoing difficulties with her son and daughter-in-law with regards to caring for her grandchildren and validated her feelings.  Also utilized motivational interviewing techniques including complex reflections to assist the patient in identifying and acknowledging things she can think she cannot control but also validated her right to take appropriate steps if she feels her grandchildren are not being appropriately cared for.  Continue to focus on healthy skills of daily living and behavioral activation.  Provided unconditional positive regard in a safe, supportive environment.       Allowed patient to freely discuss issues without interruption or judgment. Provided safe, confidential environment to facilitate the development of positive therapeutic relationship and encourage open, honest communication. Assisted patient in identifying risk factors which would indicate the need for higher level of care including thoughts to harm self or others and/or self-harming behavior and encouraged patient to contact this office, call 911, or present to the nearest emergency room should any of these events occur. Discussed crisis intervention services and means to access.  Patient adamantly and convincingly denies current suicidal or homicidal ideation or perceptual disturbance.    Assessment    Patient continues to struggle with  depression and anxiety which continue to be exacerbated by significant psychosocial stressors including ongoing difficulties with family members and her sense of responsibility for her grandchildren.  As a result, the patient can be reasonably expected to continue to benefit from treatment and would likely be at increased risk for decompensation.    Diagnoses and all  orders for this visit:    Major depressive disorder, recurrent episode, moderate (CMS/HCC)    Generalized anxiety disorder               Mental Status Exam  Hygiene:  good  Dress:  casual  Attitude:  Cooperative  Motor Activity:  Appropriate  Speech:  Normal  Mood:  depressed  Affect:  depressed  Thought Processes:  Linear  Thought Content:  normal  Suicidal Thoughts:  denies  Homicidal Thoughts:  denies  Crisis Safety Plan: yes, to come to the emergency room.  Hallucinations:  denies    Patient's Support Network Includes:  extended family    Progress toward goal: Not at goal    Functional Status: Moderate impairment     Prognosis: Fair with Ongoing Treatment     Plan         Patient will continue in outpatient therapy session at the [facility] in approximately 4 weeks.  Patient will continue in pharmacotherapy as scheduled with YURIY Morton.    Patient will adhere to medication regimen as prescribed and report any side effects. Patient will contact this office, call 911 or present to the nearest emergency room should suicidal or homicidal ideations occur. Provide Cognitive Behavioral Therapy and Integrative Therapy to improve functioning, maintain stability, and avoid decompensation and the need for higher level of care.          Return in about 4 weeks (around 8/21/2019).      This document signed by Jovnay Ellis LCSW, [facility] July 24, 2019 5:56 PM

## 2019-08-05 ENCOUNTER — OFFICE VISIT (OUTPATIENT)
Dept: PSYCHIATRY | Facility: CLINIC | Age: 49
End: 2019-08-05

## 2019-08-05 VITALS
DIASTOLIC BLOOD PRESSURE: 80 MMHG | HEIGHT: 66 IN | HEART RATE: 75 BPM | WEIGHT: 140 LBS | SYSTOLIC BLOOD PRESSURE: 128 MMHG | BODY MASS INDEX: 22.5 KG/M2

## 2019-08-05 DIAGNOSIS — F41.1 GENERALIZED ANXIETY DISORDER: ICD-10-CM

## 2019-08-05 DIAGNOSIS — F33.1 MAJOR DEPRESSIVE DISORDER, RECURRENT EPISODE, MODERATE (HCC): Primary | ICD-10-CM

## 2019-08-05 PROCEDURE — 99213 OFFICE O/P EST LOW 20 MIN: CPT | Performed by: NURSE PRACTITIONER

## 2019-08-05 RX ORDER — VENLAFAXINE HYDROCHLORIDE 150 MG/1
CAPSULE, EXTENDED RELEASE ORAL
Qty: 90 CAPSULE | Refills: 0 | Status: SHIPPED | OUTPATIENT
Start: 2019-08-05 | End: 2019-11-04 | Stop reason: SDUPTHER

## 2019-08-05 RX ORDER — VENLAFAXINE HYDROCHLORIDE 75 MG/1
75 CAPSULE, EXTENDED RELEASE ORAL DAILY
Qty: 90 CAPSULE | Refills: 0 | Status: SHIPPED | OUTPATIENT
Start: 2019-08-05 | End: 2019-11-04 | Stop reason: SDUPTHER

## 2019-08-05 RX ORDER — BUSPIRONE HYDROCHLORIDE 30 MG/1
30 TABLET ORAL 2 TIMES DAILY
Qty: 180 TABLET | Refills: 0 | Status: SHIPPED | OUTPATIENT
Start: 2019-08-05 | End: 2019-11-04 | Stop reason: SDUPTHER

## 2019-08-05 NOTE — PROGRESS NOTES
Subjective   Sangeeta Aguilar is a 48 y.o. female is here today for medication management follow-up.    Chief Complaint: recheck on depression    History of Present Illness:  Pt presents for follow up at the [facility].  Depression and anxiety about the same.  Trying to get custody of the grandkids. States this has always been the source of her anxiety and depression in worrying about them  Currently rates anxiety and depression both a 2/10 with 10 being worse.  Denies any suicidal thoughts, homicidal thoughts, or any a/V hallucinations.  Body mass index is 22.61 kg/m². no appetite changes.  Sleeping better at least 7 hours a night.  Thinks this is because she has had the kids the majority of the summer.  No medical stressors.  No negative side effects to the meds.  Mood is stable.  No anger outbursts.  No feelings of hopelessness.  No palpitations.              The following portions of the patient's history were reviewed and updated as appropriate: allergies, current medications, past family history, past medical history, past social history, past surgical history and problem list.    Review of Systems   Constitutional: Negative for activity change, appetite change and fatigue.   Eyes: Negative for visual disturbance.   Respiratory: Negative.    Cardiovascular: Negative.    Gastrointestinal: Negative for nausea.   Endocrine: Negative.    Genitourinary: Negative.    Musculoskeletal: Negative for arthralgias.   Skin: Negative.    Allergic/Immunologic: Negative.    Neurological: Positive for headaches. Negative for dizziness and seizures.   Hematological: Negative.    Psychiatric/Behavioral: Negative for agitation, behavioral problems, confusion, decreased concentration, dysphoric mood, hallucinations, self-injury, sleep disturbance and suicidal ideas. The patient is not nervous/anxious and is not hyperactive.        Objective   Physical Exam   Constitutional: She is oriented to person, place, and  "time. She appears well-developed and well-nourished.   Neck: Normal range of motion.   Neurological: She is alert and oriented to person, place, and time.   Psychiatric: She has a normal mood and affect. Her speech is normal and behavior is normal. Judgment and thought content normal. Cognition and memory are normal.   Pleasant and cooperative   Vitals reviewed.    Blood pressure 128/80, pulse 75, height 167.6 cm (65.98\"), weight 63.5 kg (140 lb).    Medication List:   Current Outpatient Medications   Medication Sig Dispense Refill   • atenolol (TENORMIN) 25 MG tablet      • atenolol (TENORMIN) 50 MG tablet      • ATROVENT HFA 17 MCG/ACT inhaler      • betamethasone dipropionate (DIPROLENE) 0.05 % cream      • busPIRone (BUSPAR) 30 MG tablet Take 1 tablet by mouth 2 (Two) Times a Day. 180 tablet 0   • DUPIXENT 300 MG/2ML solution prefilled syringe      • gabapentin (NEURONTIN) 600 MG tablet      • HYDROcodone-acetaminophen (NORCO)  MG per tablet      • hydroxychloroquine (PLAQUENIL) 200 MG tablet      • levocetirizine (XYZAL) 5 MG tablet      • omeprazole (priLOSEC) 40 MG capsule      • raNITIdine (ZANTAC) 300 MG tablet      • tiZANidine (ZANAFLEX) 4 MG tablet Take 4 mg by mouth At Night As Needed for Muscle Spasms.     • triamcinolone (KENALOG) 0.1 % cream      • venlafaxine XR (EFFEXOR XR) 150 MG 24 hr capsule Take 1 tablet daily along with Effexor Xr 75mg for total of 225mg 90 capsule 0   • venlafaxine XR (EFFEXOR-XR) 75 MG 24 hr capsule Take 1 capsule by mouth Daily. Take along with effexor xr 150mg for total of 225mg a day 90 capsule 0     No current facility-administered medications for this visit.        Mental Status Exam:   Hygiene:   good  Cooperation:  Cooperative  Eye Contact:  Good  Psychomotor Behavior:  Appropriate  Affect:  Full range  Hopelessness: Denies  Speech:  Normal  Thought Process:  Goal directed  Thought Content:  Normal  Suicidal:  None  Homicidal:  None  Hallucinations:  " None  Delusion:  None  Memory:  Intact  Orientation:  Person, Place, Time and Situation  Reliability:  good  Insight:  Good  Judgement:  Good  Impulse Control:  Good  Physical/Medical Issues:  No     Assessment/Plan   Problems Addressed this Visit     None      Visit Diagnoses     Major depressive disorder, recurrent episode, moderate (CMS/HCC)    -  Primary    Relevant Medications    venlafaxine XR (EFFEXOR-XR) 75 MG 24 hr capsule    venlafaxine XR (EFFEXOR XR) 150 MG 24 hr capsule    busPIRone (BUSPAR) 30 MG tablet    Generalized anxiety disorder        Relevant Medications    venlafaxine XR (EFFEXOR-XR) 75 MG 24 hr capsule    venlafaxine XR (EFFEXOR XR) 150 MG 24 hr capsule    busPIRone (BUSPAR) 30 MG tablet          Functionality: pt having minimal impairment in important areas of daily functioning.  Prognosis:  guarded dependent on medication/follow up and treatment plan compliance.    She is going to continue the buspar and the effexor.  She is to continue therapy with Jovany Ellis.  Continuing efforts to promote the therapeutic alliance, address the patient's issues, and strengthen self awareness, insights, and coping skills.  She has not been compliant with her therapy appointments and this was strongly encouraged. .  Patient is agreeable to call the [facility].  Patient is aware to call 911 or go to the nearest ER should begin having SI/HI. RTC 12 weeks.

## 2019-11-04 DIAGNOSIS — F33.1 MAJOR DEPRESSIVE DISORDER, RECURRENT EPISODE, MODERATE (HCC): ICD-10-CM

## 2019-11-04 DIAGNOSIS — F41.1 GENERALIZED ANXIETY DISORDER: ICD-10-CM

## 2019-11-04 RX ORDER — VENLAFAXINE HYDROCHLORIDE 150 MG/1
CAPSULE, EXTENDED RELEASE ORAL
Qty: 90 CAPSULE | Refills: 0 | Status: SHIPPED | OUTPATIENT
Start: 2019-11-04 | End: 2019-12-05 | Stop reason: SDUPTHER

## 2019-11-04 RX ORDER — BUSPIRONE HYDROCHLORIDE 30 MG/1
30 TABLET ORAL 2 TIMES DAILY
Qty: 180 TABLET | Refills: 0 | Status: SHIPPED | OUTPATIENT
Start: 2019-11-04 | End: 2019-12-05 | Stop reason: SDUPTHER

## 2019-11-04 RX ORDER — VENLAFAXINE HYDROCHLORIDE 75 MG/1
75 CAPSULE, EXTENDED RELEASE ORAL DAILY
Qty: 90 CAPSULE | Refills: 0 | Status: SHIPPED | OUTPATIENT
Start: 2019-11-04 | End: 2019-12-05 | Stop reason: SDUPTHER

## 2019-11-19 ENCOUNTER — OFFICE VISIT (OUTPATIENT)
Dept: PSYCHIATRY | Facility: CLINIC | Age: 49
End: 2019-11-19

## 2019-11-19 DIAGNOSIS — F33.1 MAJOR DEPRESSIVE DISORDER, RECURRENT EPISODE, MODERATE (HCC): Primary | ICD-10-CM

## 2019-11-19 DIAGNOSIS — F41.1 GENERALIZED ANXIETY DISORDER: ICD-10-CM

## 2019-11-19 PROCEDURE — 90837 PSYTX W PT 60 MINUTES: CPT | Performed by: SOCIAL WORKER

## 2019-11-19 NOTE — TREATMENT PLAN
Multi-Disciplinary Problems (from Behavioral Health Treatment Plan)    Active Problems     Problem: Anxiety  Start Date: 11/19/19    Problem Details:  The patient self-scales this problem as a 5 with 10 being the worst.       Goal Priority Start Date Expected End Date End Date    Patient will develop and implement behavioral and cognitive strategies to reduce anxiety and irrational fears. High 11/19/19 11/19/20 --    Goal Details:  Progress toward goal:  The patient self-scales their progress related to this goal as a 4 with 10 being the worst.       Goal Intervention Frequency Start Date End Date    Help patient explore past emotional issues in relation to present anxiety. Q Month 11/19/19 11/19/20    Intervention Details:  Duration of treatment until remission of symptoms.       Goal Intervention Frequency Start Date End Date    Help patient develop an awareness of their cognitive and physical responses to anxiety. Q Month 11/19/19 11/19/20    Intervention Details:  Duration of treatment until remission of symptoms.             Problem: Depression  Start Date: 11/19/19    Problem Details:  The patient self-scales this problem as a 5 with 10 being the worst.       Goal Priority Start Date Expected End Date End Date    Patient will demonstrate the ability to initiate new constructive life skills outside of sessions on a consistent basis. High 11/19/19 11/19/20 --    Goal Details:  Progress toward goal:  The patient self-scales their progress related to this goal as a 4 with 10 being the worst.       Goal Intervention Frequency Start Date End Date    Assist patient in setting attainable activities of daily living goals. Q Month 11/19/19 11/19/20    Intervention Details:  Patient will keep all appointments and follow recommendations. Patient will focus on healthy skills of daily living and behavioral activation. Patient will focus on stress management.      Goal Intervention Frequency Start Date End Date    Provide  education about depression Q Month 11/19/19 11/19/20    Intervention Details:  Duration of treatment until remission of symptoms.       Goal Intervention Frequency Start Date End Date    Assist patient in developing healthy coping strategies. Q Month 11/19/19 11/19/20    Intervention Details:  Duration of treatment until remission of symptoms.                          I have discussed and reviewed this treatment plan with the patient.  It has been printed for signatures.  This document signed by Jovany Ellis LCSW, [facility] November 19, 2019 11:43 AM

## 2019-11-20 NOTE — PROGRESS NOTES
"Date of Service: November 19, 2019  Time In: 11:30 AM  Time Out: 12:30 PM      PROGRESS NOTE  Data:  Sangeeta Aguilar is a 49 y.o. female who met with the undersigned for a regularly scheduled individual outpatient psychotherapy session at  the [facility] for follow-up of depression and anxiety.  Patient reports she has missed several appointments due to having been the sole caretaker of HER-2 young grandchildren for approximately the last 5 months.  Patient reports her grandchildren returned from the severity to live with her after that her mother was unable to continue to care for them or to provide appropriate housing.  Patient reports her son, the children's father, continues to be in a long-term treatment facility until some point in early spring of next year.     HPI: Patient reports she feels as though she is doing \"okay\" but states she is very stressed and physically tired as she is attempting to be the sole caretaker for 2 young children.  Patient does report she is sleeping somewhat better and states \"I am so tired I cannot help it by the end of the day\".  Patient reports ongoing depression as evidenced by depressed mood, anhedonia, anergia, periods of hopelessness, difficulty concentrating, and periods of social isolation.  Patient rates current symptoms of depression at 4/5 on a scale 1-10 with 10 being most severe.  Patient also continues to struggle with anxiety including anxious mood, feeling on edge, feeling overwhelmed, increased heart rate, muscle tension, and urge to escape the situation and a sense of impending doom.  Patient rates current symptoms of anxiety at 5 on scale 1-10 with 10 being most severe.     Patient reports she continues to adhere to medication regimen as prescribed.  Patient adamantly and convincingly denies suicidal ideation vehemently denies any substance use.      Clinical Maneuvering/Intervention:  Assisted patient in processing above session content; acknowledged and " normalized patient’s thoughts, feelings, and concerns.  Allowed the patient to discuss/process her feelings and fears concerning the well-being and the future of her grandchildren and validated her feelings.  Also utilized motivational reviewed techniques including complex reflections to discussed concept of things we can control things he cannot control and encouraged the patient to remind herself she cannot be responsible for the decisions or behaviors of others.  Also utilized reality based theory to assist the patient in identifying and agreeing that she likely needs to seek some sort of at least temporary emergency custody so that she can have appropriate rights to care for the children including with their school and for medical care.  Continue to focus on healthy skills of daily living and behavioral activation and reiterated the importance of the patient finding at least some time for herself which will aid and distress tolerance.  Provided unconditional positive regard in a safe, supportive environment.    Patient actively participated in the formulation of the treatment plan and verbalizes agreement with all goals and objectives.  Treatment plan completed on this date.     Allowed patient to freely discuss issues without interruption or judgment. Provided safe, confidential environment to facilitate the development of positive therapeutic relationship and encourage open, honest communication. Assisted patient in identifying risk factors which would indicate the need for higher level of care including thoughts to harm self or others and/or self-harming behavior and encouraged patient to contact this office, call 911, or present to the nearest emergency room should any of these events occur. Discussed crisis intervention services and means to access.  Patient adamantly and convincingly denies current suicidal or homicidal ideation or perceptual disturbance.    Assessment    Patient continues to struggle with   depression and anxiety which continue to be exacerbated by significant psychosocial stressors including ongoing difficulties with family members and recently becoming the sole caretaker of HER-2 young grandchildren, 1 of which she reports has been diagnosed with autism spectrum disorder.  As a result, the patient can be reasonably expected to continue to benefit from treatment and would likely be at increased risk for decompensation.    Diagnoses and all orders for this visit:    Major depressive disorder, recurrent episode, moderate (CMS/HCC)    Generalized anxiety disorder               Mental Status Exam  Hygiene:  good  Dress:  casual  Attitude:  Cooperative  Motor Activity:  Appropriate  Speech:  Normal  Mood:  depressed  Affect:  depressed  Thought Processes:  Linear  Thought Content:  normal  Suicidal Thoughts:  denies  Homicidal Thoughts:  denies  Crisis Safety Plan: yes, to come to the emergency room.  Hallucinations:  denies    Patient's Support Network Includes:  extended family    Progress toward goal: Not at goal    Functional Status: Moderate impairment     Prognosis: Fair with Ongoing Treatment     Plan         Patient will continue in outpatient therapy session at [facility] in approximately 4 weeks as this is more geographically convenient.  Patient will continue in pharmacotherapy as scheduled with YURIY Morton.    Patient will adhere to medication regimen as prescribed and report any side effects. Patient will contact this office, call 911 or present to the nearest emergency room should suicidal or homicidal ideations occur. Provide Cognitive Behavioral Therapy and Integrative Therapy to improve functioning, maintain stability, and avoid decompensation and the need for higher level of care.          Return in about 4 weeks (around 12/17/2019) for Next scheduled follow up.      This document signed by Jovany Ellis LCSW, [facility] November 20, 2019 10:23 AM

## 2019-12-05 ENCOUNTER — OFFICE VISIT (OUTPATIENT)
Dept: PSYCHIATRY | Facility: CLINIC | Age: 49
End: 2019-12-05

## 2019-12-05 VITALS
WEIGHT: 136 LBS | DIASTOLIC BLOOD PRESSURE: 76 MMHG | BODY MASS INDEX: 21.86 KG/M2 | SYSTOLIC BLOOD PRESSURE: 128 MMHG | HEIGHT: 66 IN | HEART RATE: 86 BPM

## 2019-12-05 DIAGNOSIS — F33.1 MAJOR DEPRESSIVE DISORDER, RECURRENT EPISODE, MODERATE (HCC): Primary | ICD-10-CM

## 2019-12-05 DIAGNOSIS — F41.1 GENERALIZED ANXIETY DISORDER: ICD-10-CM

## 2019-12-05 PROCEDURE — 99213 OFFICE O/P EST LOW 20 MIN: CPT | Performed by: NURSE PRACTITIONER

## 2019-12-05 RX ORDER — VENLAFAXINE HYDROCHLORIDE 75 MG/1
75 CAPSULE, EXTENDED RELEASE ORAL DAILY
Qty: 90 CAPSULE | Refills: 0 | Status: SHIPPED | OUTPATIENT
Start: 2019-12-05 | End: 2020-04-02 | Stop reason: SDUPTHER

## 2019-12-05 RX ORDER — VENLAFAXINE HYDROCHLORIDE 150 MG/1
CAPSULE, EXTENDED RELEASE ORAL
Qty: 90 CAPSULE | Refills: 0 | Status: SHIPPED | OUTPATIENT
Start: 2019-12-05 | End: 2020-04-02 | Stop reason: SDUPTHER

## 2019-12-05 RX ORDER — BUSPIRONE HYDROCHLORIDE 30 MG/1
30 TABLET ORAL 2 TIMES DAILY
Qty: 180 TABLET | Refills: 0 | Status: SHIPPED | OUTPATIENT
Start: 2019-12-05 | End: 2020-04-02 | Stop reason: SDUPTHER

## 2019-12-05 NOTE — TREATMENT PLAN
Multi-Disciplinary Problems (from Behavioral Health Treatment Plan)    Active Problems     Problem: Anxiety  Start Date: 12/05/19    Problem Details:  The patient self-scales this problem as a 3 with 10 being the worst.       Goal Priority Start Date Expected End Date End Date    Patient will develop and implement behavioral and cognitive strategies to reduce anxiety and irrational fears. -- 12/05/19 -- --    Goal Details:  Progress toward goal:  The patient self-scales their progress related to this goal as a 3 with 10 being the worst.       Goal Intervention Frequency Start Date End Date    Help patient explore past emotional issues in relation to present anxiety. Weekly 12/05/19 --    Intervention Details:  Duration of treatment until until remission of symptoms.       Goal Intervention Frequency Start Date End Date    Help patient develop an awareness of their cognitive and physical responses to anxiety. Weekly 12/05/19 --    Intervention Details:  Duration of treatment until until remission of symptoms.             Problem: Depression  Start Date: 12/05/19    Problem Details:  The patient self-scales this problem as a 3 with 10 being the worst.       Goal Priority Start Date Expected End Date End Date    Patient will demonstrate the ability to initiate new constructive life skills outside of sessions on a consistent basis. -- 12/05/19 -- --    Goal Details:  Progress toward goal:  The patient self-scales their progress related to this goal as a 3 with 10 being the worst.       Goal Intervention Frequency Start Date End Date    Assist patient in setting attainable activities of daily living goals. PRN 12/05/19 --    Goal Intervention Frequency Start Date End Date    Provide education about depression Weekly 12/05/19 --    Intervention Details:  Duration of treatment until until remission of symptoms.       Goal Intervention Frequency Start Date End Date    Assist patient in developing healthy coping strategies.  Weekly 12/05/19 --    Intervention Details:  Duration of treatment until until remission of symptoms.                          I have discussed and reviewed this treatment plan with the patient.

## 2019-12-05 NOTE — PROGRESS NOTES
"      Subjective   Sangeeta Aguilar is a 49 y.o. female is here today for medication management follow-up.    Chief Complaint: recheck on depression    History of Present Illness:  Pt presents for follow up at the [facility].  She states her depression and anxiety are \"about the same\".  She still has rare panic attack but not often.  No negative side effects to the meds.  She denies any SI, HI, or any AVH.  Sleep is restless at times.  She continues to have her 2 grandchildren living with her.  This is hard on her physically but good for her mentally.  Body mass index is 21.96 kg/m². no weight changes.  Has URI coughing etc.                The following portions of the patient's history were reviewed and updated as appropriate: allergies, current medications, past family history, past medical history, past social history, past surgical history and problem list.    Review of Systems   Constitutional: Negative for activity change, appetite change and fatigue.   Eyes: Negative for visual disturbance.   Respiratory: Positive for cough.    Cardiovascular: Negative.    Gastrointestinal: Negative for nausea.   Endocrine: Negative.    Genitourinary: Negative.    Musculoskeletal: Positive for back pain. Negative for arthralgias.   Skin: Negative.    Allergic/Immunologic: Negative.    Neurological: Positive for headaches. Negative for dizziness and seizures.   Hematological: Negative.    Psychiatric/Behavioral: Negative for agitation, behavioral problems, confusion, decreased concentration, dysphoric mood, hallucinations, self-injury, sleep disturbance and suicidal ideas. The patient is not nervous/anxious and is not hyperactive.        Objective   Physical Exam   Constitutional: She is oriented to person, place, and time. She appears well-developed and well-nourished.   Neck: Normal range of motion.   Neurological: She is alert and oriented to person, place, and time.   Psychiatric: She has a normal mood and " "affect. Her speech is normal and behavior is normal. Judgment and thought content normal. Cognition and memory are normal.   Pleasant and cooperative   Vitals reviewed.    Blood pressure 128/76, pulse 86, height 167.6 cm (65.98\"), weight 61.7 kg (136 lb).    Medication List:   Current Outpatient Medications   Medication Sig Dispense Refill   • atenolol (TENORMIN) 25 MG tablet      • atenolol (TENORMIN) 50 MG tablet      • ATROVENT HFA 17 MCG/ACT inhaler      • betamethasone dipropionate (DIPROLENE) 0.05 % cream      • busPIRone (BUSPAR) 30 MG tablet Take 1 tablet by mouth 2 (Two) Times a Day. 180 tablet 0   • DUPIXENT 300 MG/2ML solution prefilled syringe      • gabapentin (NEURONTIN) 600 MG tablet      • HYDROcodone-acetaminophen (NORCO)  MG per tablet      • hydroxychloroquine (PLAQUENIL) 200 MG tablet      • levocetirizine (XYZAL) 5 MG tablet      • omeprazole (priLOSEC) 40 MG capsule      • raNITIdine (ZANTAC) 300 MG tablet      • tiZANidine (ZANAFLEX) 4 MG tablet Take 4 mg by mouth At Night As Needed for Muscle Spasms.     • triamcinolone (KENALOG) 0.1 % cream      • venlafaxine XR (EFFEXOR XR) 150 MG 24 hr capsule Take 1 tablet daily along with Effexor Xr 75mg for total of 225mg 90 capsule 0   • venlafaxine XR (EFFEXOR-XR) 75 MG 24 hr capsule Take 1 capsule by mouth Daily. Take along with effexor xr 150mg for total of 225mg a day 90 capsule 0     No current facility-administered medications for this visit.        Mental Status Exam:   Hygiene:   good  Cooperation:  Cooperative  Eye Contact:  Good  Psychomotor Behavior:  Appropriate  Affect:  Full range  Hopelessness: Denies  Speech:  Normal  Thought Process:  Goal directed  Thought Content:  Normal  Suicidal:  None  Homicidal:  None  Hallucinations:  None  Delusion:  None  Memory:  Intact  Orientation:  Person, Place, Time and Situation  Reliability:  good  Insight:  Good  Judgement:  Good  Impulse Control:  Good  Physical/Medical Issues:  No "     Assessment/Plan   Problems Addressed this Visit     None      Visit Diagnoses     Major depressive disorder, recurrent episode, moderate (CMS/HCC)    -  Primary    Relevant Medications    venlafaxine XR (EFFEXOR-XR) 75 MG 24 hr capsule    venlafaxine XR (EFFEXOR XR) 150 MG 24 hr capsule    busPIRone (BUSPAR) 30 MG tablet    Generalized anxiety disorder        Relevant Medications    venlafaxine XR (EFFEXOR-XR) 75 MG 24 hr capsule    venlafaxine XR (EFFEXOR XR) 150 MG 24 hr capsule    busPIRone (BUSPAR) 30 MG tablet          Functionality: pt having minimal impairment in important areas of daily functioning.  Prognosis:  guarded dependent on medication/follow up and treatment plan compliance.  She is currently pleased with medication regimen.   She is going to continue the buspar and the effexor.  She is to continue therapy with Jovany Ellis.  Continuing efforts to promote the therapeutic alliance, address the patient's issues, and strengthen self awareness, insights, and coping skills.    Patient is agreeable to call the [facility].  Patient is aware to call 911 or go to the nearest ER should begin having SI/HI. RTC 12 weeks.

## 2020-02-11 ENCOUNTER — OFFICE VISIT (OUTPATIENT)
Dept: PSYCHIATRY | Facility: CLINIC | Age: 50
End: 2020-02-11

## 2020-02-11 DIAGNOSIS — F33.1 MAJOR DEPRESSIVE DISORDER, RECURRENT EPISODE, MODERATE (HCC): Primary | ICD-10-CM

## 2020-02-11 DIAGNOSIS — F41.1 GENERALIZED ANXIETY DISORDER: ICD-10-CM

## 2020-02-11 PROCEDURE — 90832 PSYTX W PT 30 MINUTES: CPT | Performed by: SOCIAL WORKER

## 2020-02-12 NOTE — PROGRESS NOTES
Date of Service: February 11, 2020  Time In: 2:45 PM  Time Out: 3:10 PM      PROGRESS NOTE  Data:  Sangeeta Aguilar is a 49 y.o. female who met with the undersigned for a regularly scheduled individual outpatient psychotherapy session at  the [facility] for follow-up of depression and anxiety.       HPI: Patient reports her 2 young grandchildren are currently living with her and states she has an upcoming court date to seek temporary custody.  She also reports her son, their father has recently returned home after approximately 2 years and residential substance abuse rehabilitation.  Patient reports ongoing depression as evidenced by depressed mood, anhedonia, anergia, periods of hopelessness, difficulty concentrating, and periods of social isolation.  Patient rates current symptoms of depression at 4 on a scale 1-10 with 10 being most severe.  Patient also continues to struggle with anxiety including anxious mood, feeling on edge, feeling overwhelmed, increased heart rate, muscle tension, and urge to escape the situation and a sense of impending doom.  Patient rates current symptoms of anxiety at 5 on scale 1-10 with 10 being most severe.     Patient reports she continues to adhere to medication regimen as prescribed.  Patient adamantly and convincingly denies suicidal ideation vehemently denies any substance use.      Clinical Maneuvering/Intervention:  Assisted patient in processing above session content; acknowledged and normalized patient’s thoughts, feelings, and concerns.  Allow the patient to discuss/process her feelings concerning the stress and jimmy of having her grandchildren and having her son home from treatment.  Also utilized motivational reviewed techniques including complex reflections to discussed concept of things we can control things he cannot control and encouraged her to remind herself she cannot control the decisions or behaviors of others.  Also utilized cognitive behavioral therapy to  assist patient in recognizing automatic negative cognitions and challenging with factual counter arguments.  Also discussed the importance of stress management and encouraged the patient to find activities she can engage in on a regular basis.  Also focused on healthy skills of daily living and behavioral activation.  Provided unconditional positive regard in a safe, supportive environment.      Allowed patient to freely discuss issues without interruption or judgment. Provided safe, confidential environment to facilitate the development of positive therapeutic relationship and encourage open, honest communication. Assisted patient in identifying risk factors which would indicate the need for higher level of care including thoughts to harm self or others and/or self-harming behavior and encouraged patient to contact this office, call 911, or present to the nearest emergency room should any of these events occur. Discussed crisis intervention services and means to access.  Patient adamantly and convincingly denies current suicidal or homicidal ideation or perceptual disturbance.    Assessment    Patient continues to struggle with  depression and anxiety which continue to be exacerbated by significant psychosocial stressors including ongoing difficulties with family members and recently becoming the sole caretaker of HER-2 young grandchildren, 1 of which she reports has been diagnosed with autism spectrum disorder.  As a result, the patient can be reasonably expected to continue to benefit from treatment and would likely be at increased risk for decompensation.    Diagnoses and all orders for this visit:    Major depressive disorder, recurrent episode, moderate (CMS/HCC)    Generalized anxiety disorder               Mental Status Exam  Hygiene:  good  Dress:  casual  Attitude:  Cooperative  Motor Activity:  Appropriate  Speech:  Normal  Mood:  depressed  Affect:  depressed  Thought Processes:  Linear  Thought Content:   normal  Suicidal Thoughts:  denies  Homicidal Thoughts:  denies  Crisis Safety Plan: yes, to come to the emergency room.  Hallucinations:  denies    Patient's Support Network Includes:  extended family    Progress toward goal: Not at goal    Functional Status: Moderate impairment     Prognosis: Fair with Ongoing Treatment     Plan         Patient will continue in outpatient therapy session at [facility] in approximately 4 weeks as this is more geographically convenient.  Patient will continue in pharmacotherapy as scheduled with YURIY Morton.    Patient will adhere to medication regimen as prescribed and report any side effects. Patient will contact this office, call 911 or present to the nearest emergency room should suicidal or homicidal ideations occur. Provide Cognitive Behavioral Therapy and Integrative Therapy to improve functioning, maintain stability, and avoid decompensation and the need for higher level of care.          Return in about 4 weeks (around 3/10/2020) for Next scheduled follow up.      This document signed by Jovany Ellis LCSW, ZAHIDA February 12, 2020 6:07 PM

## 2020-04-02 ENCOUNTER — TELEPHONE (OUTPATIENT)
Dept: PSYCHIATRY | Facility: CLINIC | Age: 50
End: 2020-04-02

## 2020-04-02 DIAGNOSIS — F33.1 MAJOR DEPRESSIVE DISORDER, RECURRENT EPISODE, MODERATE (HCC): ICD-10-CM

## 2020-04-02 DIAGNOSIS — F41.1 GENERALIZED ANXIETY DISORDER: ICD-10-CM

## 2020-04-02 RX ORDER — VENLAFAXINE HYDROCHLORIDE 150 MG/1
CAPSULE, EXTENDED RELEASE ORAL
Qty: 90 CAPSULE | Refills: 0 | Status: SHIPPED | OUTPATIENT
Start: 2020-04-02 | End: 2020-07-27 | Stop reason: SDUPTHER

## 2020-04-02 RX ORDER — VENLAFAXINE HYDROCHLORIDE 75 MG/1
75 CAPSULE, EXTENDED RELEASE ORAL DAILY
Qty: 90 CAPSULE | Refills: 0 | Status: SHIPPED | OUTPATIENT
Start: 2020-04-02 | End: 2020-07-27 | Stop reason: SDUPTHER

## 2020-04-02 RX ORDER — BUSPIRONE HYDROCHLORIDE 30 MG/1
30 TABLET ORAL 2 TIMES DAILY
Qty: 180 TABLET | Refills: 0 | Status: SHIPPED | OUTPATIENT
Start: 2020-04-02 | End: 2020-07-27 | Stop reason: SDUPTHER

## 2020-07-27 ENCOUNTER — OFFICE VISIT (OUTPATIENT)
Dept: PSYCHIATRY | Facility: CLINIC | Age: 50
End: 2020-07-27

## 2020-07-27 DIAGNOSIS — F41.1 GENERALIZED ANXIETY DISORDER: ICD-10-CM

## 2020-07-27 DIAGNOSIS — F33.1 MAJOR DEPRESSIVE DISORDER, RECURRENT EPISODE, MODERATE (HCC): ICD-10-CM

## 2020-07-27 PROCEDURE — G2025 DIS SITE TELE SVCS RHC/FQHC: HCPCS | Performed by: NURSE PRACTITIONER

## 2020-07-27 RX ORDER — BUSPIRONE HYDROCHLORIDE 30 MG/1
30 TABLET ORAL 2 TIMES DAILY
Qty: 180 TABLET | Refills: 0 | Status: SHIPPED | OUTPATIENT
Start: 2020-07-27 | End: 2020-10-29 | Stop reason: SDUPTHER

## 2020-07-27 RX ORDER — VENLAFAXINE HYDROCHLORIDE 150 MG/1
CAPSULE, EXTENDED RELEASE ORAL
Qty: 90 CAPSULE | Refills: 0 | Status: SHIPPED | OUTPATIENT
Start: 2020-07-27 | End: 2020-10-29 | Stop reason: SDUPTHER

## 2020-07-27 RX ORDER — VENLAFAXINE HYDROCHLORIDE 75 MG/1
75 CAPSULE, EXTENDED RELEASE ORAL DAILY
Qty: 90 CAPSULE | Refills: 0 | Status: SHIPPED | OUTPATIENT
Start: 2020-07-27 | End: 2020-10-29 | Stop reason: SDUPTHER

## 2020-07-27 NOTE — PROGRESS NOTES
This is a telephone visit due to the covid 10 pandemic.  She gives permission for the visit to take place over the phone.      Cc:  Recheck on depression and anxiety    Pt says she is moving to [place] next month.  Says she still has custody of her grandchildren but they are moving back in with their parents.  Says she is OK with this as long as they take care of the children.  States that her depression and anxiety are fine.  She continues to take the Effexor to 25 mg total as well as the BuSpar.  Sleeping well at night without difficulty.  No negative side effects to the medication.  No new medical stressors.  Mood is stable.  She continues to be pleased with her medication.  Denies any suicidal thoughts or any AV hallucinations.  Anxiety is stable.      Treatment plan: Send in 3 months of the patient's medications.  She will find a provider in [place] to overtake prescribing of her medications.  Verbalized to her that should she come back to this area as she can always come back and be seen if she needs a provider.  In the meantime she will notify me should any symptoms worsen and she does not have a provider up there.    This visit has been rescheduled as a phone visit to comply with patient safety concerns in accordance with CDC recommendations. Total time of discussion was 15 minutes.

## 2020-08-31 ENCOUNTER — OFFICE VISIT (OUTPATIENT)
Dept: PSYCHIATRY | Facility: CLINIC | Age: 50
End: 2020-08-31

## 2020-08-31 DIAGNOSIS — F41.1 GENERALIZED ANXIETY DISORDER: ICD-10-CM

## 2020-08-31 DIAGNOSIS — F33.1 MAJOR DEPRESSIVE DISORDER, RECURRENT EPISODE, MODERATE (HCC): Primary | ICD-10-CM

## 2020-08-31 PROCEDURE — G2025 DIS SITE TELE SVCS RHC/FQHC: HCPCS | Performed by: NURSE PRACTITIONER

## 2020-08-31 NOTE — PROGRESS NOTES
"This is a telephone visit due to the covid 10 pandemic.  She gives permission for the visit to take place over the phone.      Cc:  Recheck on depression and anxiety  Patient states that she did not remain in [place] that she missed the kids too much and has now moved back to [place].  Continues to have custody of her grandchildren.  States that the parents live on the property however that pretty much staying with her.  She states that her depression and anxiety are \"about the same\".  She states she has good days and bad days.  No suicidal thoughts.  Sleep is decent most of the time.  No negative side effects to the medication.  No current panic attacks.  Says that most of her stress is situational and depression will fluctuate depending upon the stressors.    Treatment plan: she is to continue her present medications.  Will RTC in 8 weeks.  Sooner if needed.  She is also to continue therapy with Jovany Ellis.      This visit has been rescheduled as a phone visit to comply with patient safety concerns in accordance with CDC recommendations. Total time of discussion was 15 minutes.    "

## 2020-09-23 ENCOUNTER — OFFICE VISIT (OUTPATIENT)
Dept: PSYCHIATRY | Facility: CLINIC | Age: 50
End: 2020-09-23

## 2020-09-23 DIAGNOSIS — F33.1 MAJOR DEPRESSIVE DISORDER, RECURRENT EPISODE, MODERATE (HCC): Primary | ICD-10-CM

## 2020-09-23 DIAGNOSIS — F41.1 GENERALIZED ANXIETY DISORDER: ICD-10-CM

## 2020-09-23 PROCEDURE — 90832 PSYTX W PT 30 MINUTES: CPT | Performed by: SOCIAL WORKER

## 2020-09-24 NOTE — PROGRESS NOTES
Date of Service: September 23, 2020  Time In: 3:10 PM  Time Out: 3:40 PM      PROGRESS NOTE  Data:  Sangeeta Aguilar is a 50 y.o. female who met 1: 1 with the undersigned for a regularly scheduled individual outpatient psychotherapy session at  the [facility] for follow-up of depression and anxiety.  The undersigned and the patient wore masks throughout the session and maintained appropriate distancing.     HPI: Patient states things have been very stressful over the past few months as she has custody of HER-2 young grandchildren and is currently caring for them on a daily basis and assisting her 7-year-old grandson with online learning which is very difficult.  Patient states she finds herself very tired and stressed and states she also becomes frustrated with her son and his wife as they appear to be incapable or unwilling of caring for the children.  Patient reports ongoing depression as evidenced by depressed mood, anhedonia, anergia, periods of hopelessness, difficulty concentrating, and periods of social isolation.  Patient rates current symptoms of depression at 5 4 on a scale 1-10 with 10 being most severe.  Patient also continues to struggle with anxiety including anxious mood, feeling on edge, feeling overwhelmed, increased heart rate, muscle tension, and urge to escape the situation and a sense of impending doom.  Patient rates current symptoms of anxiety at 5 on scale 1-10 with 10 being most severe.     Patient reports she continues to adhere to medication regimen as prescribed.  Patient adamantly and convincingly denies suicidal ideation vehemently denies any substance use.      Clinical Maneuvering/Intervention:  Assisted patient in processing above session content; acknowledged and normalized patient’s thoughts, feelings, and concerns.  Allow the patient to discuss/process her feelings and frustrations concerning issues with her grandchildren and validated her feelings.  Also utilized motivational  reviewing techniques including complex reflections to point out the fact the patient has taken on full responsibility for the care of her grandchildren and praised her efforts.  Also utilized cognitive behavioral therapy to assist patient in recognizing if she does not engage in appropriate self-care she will likely struggle to care for her grandchildren and herself.  Continue to utilize cognitive behavioral therapy to assist patient in developing appropriate coping mechanisms decrease in severity and frequency of symptoms.  Provided unconditional positive regard in a safe, supportive environment.      Allowed patient to freely discuss issues without interruption or judgment. Provided safe, confidential environment to facilitate the development of positive therapeutic relationship and encourage open, honest communication. Assisted patient in identifying risk factors which would indicate the need for higher level of care including thoughts to harm self or others and/or self-harming behavior and encouraged patient to contact this office, call 911, or present to the nearest emergency room should any of these events occur. Discussed crisis intervention services and means to access.  Patient adamantly and convincingly denies current suicidal or homicidal ideation or perceptual disturbance.    Assessment    Patient continues to struggle with  depression and anxiety which continue to be exacerbated by significant psychosocial stressors including ongoing difficulties with family members and recently becoming the sole caretaker of HER-2 young grandchildren, 1 of which she reports has been diagnosed with autism spectrum disorder.  As a result, the patient can be reasonably expected to continue to benefit from treatment and would likely be at increased risk for decompensation.    Diagnoses and all orders for this visit:    Major depressive disorder, recurrent episode, moderate (CMS/HCC)    Generalized anxiety  disorder               Mental Status Exam  Hygiene:  good  Dress:  casual  Attitude:  Cooperative  Motor Activity:  Appropriate  Speech:  Normal  Mood:  depressed  Affect:  depressed  Thought Processes:  Linear  Thought Content:  normal  Suicidal Thoughts:  denies  Homicidal Thoughts:  denies  Crisis Safety Plan: yes, to come to the emergency room.  Hallucinations:  denies    Patient's Support Network Includes:  extended family    Progress toward goal: Not at goal    Functional Status: Moderate impairment     Prognosis: Fair with Ongoing Treatment     Plan         Patient will continue in outpatient therapy session at [facility] in approximately 4 weeks as this is more geographically convenient.  Patient will continue in pharmacotherapy as scheduled with YURIY Morton.    Patient will adhere to medication regimen as prescribed and report any side effects. Patient will contact this office, call 911 or present to the nearest emergency room should suicidal or homicidal ideations occur. Provide Cognitive Behavioral Therapy and Integrative Therapy to improve functioning, maintain stability, and avoid decompensation and the need for higher level of care.          Return in about 4 weeks (around 10/21/2020) for Next scheduled follow up.      This document signed by Jovany Ellis LCSW, ZAHIDA September 24, 2020 06:56 EDT

## 2020-10-19 ENCOUNTER — OFFICE VISIT (OUTPATIENT)
Dept: PSYCHIATRY | Facility: CLINIC | Age: 50
End: 2020-10-19

## 2020-10-19 DIAGNOSIS — F33.1 MAJOR DEPRESSIVE DISORDER, RECURRENT EPISODE, MODERATE (HCC): Primary | ICD-10-CM

## 2020-10-19 DIAGNOSIS — F41.1 GENERALIZED ANXIETY DISORDER: ICD-10-CM

## 2020-10-19 PROCEDURE — 90834 PSYTX W PT 45 MINUTES: CPT | Performed by: SOCIAL WORKER

## 2020-10-19 NOTE — PROGRESS NOTES
Date of Service: September 23, 2020  Time In: 3:50 PM  Time Out: 4:30 PM      PROGRESS NOTE  Data:  Sangeeta Aguilar is a 50 y.o. female who met 1: 1 with the undersigned for a regularly scheduled individual outpatient psychotherapy session at [facility] due to greater availability  for follow-up of depression and anxiety.  The undersigned and the patient wore masks throughout the session and maintained appropriate distancing.     HPI: Patient states her grandchildren's mother recently laughing and and is back in the [place].  Patient states she has temporary worst custody but states the children's mother believes she can simply come back and get them when she wants.   Patient reports ongoing depression as evidenced by depressed mood, anhedonia, anergia, periods of hopelessness, difficulty concentrating, and periods of social isolation.  Patient rates current symptoms of depression at a 5 on a scale 1-10 with 10 being most severe.  Patient also continues to struggle with anxiety including anxious mood, feeling on edge, feeling overwhelmed, increased heart rate, muscle tension, and urge to escape the situation and a sense of impending doom.  Patient rates current symptoms of anxiety at 5/6 on scale 1-10 with 10 being most severe.     Patient reports she continues to adhere to medication regimen as prescribed.  Patient adamantly and convincingly denies suicidal ideation vehemently denies any substance use.      Clinical Maneuvering/Intervention:  Assisted patient in processing above session content; acknowledged and normalized patient’s thoughts, feelings, and concerns.  Allow the patient to discuss/plan her feelings and frustrations concerning her son and his estranged wife's apparent inability or unwillingness to care for their children and the stress she is experiencing attempting to be their primary caregiver and validated her feelings.  Also utilized lower extremity techniques including  complex reflections to discussed concept of things we can control things we cannot control and encouraged her to remind her so she cannot control the decisions or behaviors of others, not even her son.  Also discussed the importance of self-care and strongly urged the patient to find activities she can engage in on a regular basis as a stress relieving technique.  Continue to utilize cognitive behavioral therapy to assist patient in developing appropriate coping mechanisms decrease in severity and frequency of symptoms.  Also utilize solution focused therapy to suggest the patient consider seeking full custody as it appears to be evident the children's parents are not capable of providing them a stable environment.  Provided unconditional positive regard in a safe, supportive environment.      Allowed patient to freely discuss issues without interruption or judgment. Provided safe, confidential environment to facilitate the development of positive therapeutic relationship and encourage open, honest communication. Assisted patient in identifying risk factors which would indicate the need for higher level of care including thoughts to harm self or others and/or self-harming behavior and encouraged patient to contact this office, call 911, or present to the nearest emergency room should any of these events occur. Discussed crisis intervention services and means to access.  Patient adamantly and convincingly denies current suicidal or homicidal ideation or perceptual disturbance.    Assessment    Patient continues to struggle with  depression and anxiety which continue to be exacerbated by significant psychosocial stressors including ongoing difficulties with family members and recently becoming the sole caretaker of HER-2 young grandchildren, 1 of which she reports has been diagnosed with autism spectrum disorder.  As a result, the patient can be reasonably expected to continue to benefit from treatment and would  likely be at increased risk for decompensation.    Diagnoses and all orders for this visit:    1. Major depressive disorder, recurrent episode, moderate (CMS/HCC) (Primary)    2. Generalized anxiety disorder               Mental Status Exam  Hygiene:  good  Dress:  casual  Attitude:  Cooperative  Motor Activity:  Appropriate  Speech:  Normal  Mood:  depressed  Affect:  depressed  Thought Processes:  Linear  Thought Content:  normal  Suicidal Thoughts:  denies  Homicidal Thoughts:  denies  Crisis Safety Plan: yes, to come to the emergency room.  Hallucinations:  denies    Patient's Support Network Includes:  extended family    Progress toward goal: Not at goal    Functional Status: Moderate impairment     Prognosis: Fair with Ongoing Treatment     Plan         Patient will continue in outpatient therapy session at [facility] in approximately 3 weeks.  Patient will continue in pharmacotherapy as scheduled with YURIY Morton.    Patient will adhere to medication regimen as prescribed and report any side effects. Patient will contact this office, call 911 or present to the nearest emergency room should suicidal or homicidal ideations occur. Provide Cognitive Behavioral Therapy and Integrative Therapy to improve functioning, maintain stability, and avoid decompensation and the need for higher level of care.          Return in about 3 weeks (around 11/9/2020) for Next scheduled follow up.      This document signed by Jovany Ellis LCSW, ZAHIDA October 19, 2020 16:52 EDT

## 2020-10-29 ENCOUNTER — OFFICE VISIT (OUTPATIENT)
Dept: PSYCHIATRY | Facility: CLINIC | Age: 50
End: 2020-10-29

## 2020-10-29 DIAGNOSIS — F33.1 MAJOR DEPRESSIVE DISORDER, RECURRENT EPISODE, MODERATE (HCC): ICD-10-CM

## 2020-10-29 DIAGNOSIS — F41.1 GENERALIZED ANXIETY DISORDER: ICD-10-CM

## 2020-10-29 PROCEDURE — G2025 DIS SITE TELE SVCS RHC/FQHC: HCPCS | Performed by: NURSE PRACTITIONER

## 2020-10-29 RX ORDER — VENLAFAXINE HYDROCHLORIDE 150 MG/1
CAPSULE, EXTENDED RELEASE ORAL
Qty: 90 CAPSULE | Refills: 0 | Status: SHIPPED | OUTPATIENT
Start: 2020-10-29 | End: 2021-01-26

## 2020-10-29 RX ORDER — BUSPIRONE HYDROCHLORIDE 30 MG/1
30 TABLET ORAL 2 TIMES DAILY
Qty: 180 TABLET | Refills: 0 | Status: SHIPPED | OUTPATIENT
Start: 2020-10-29 | End: 2021-01-26

## 2020-10-29 RX ORDER — VENLAFAXINE HYDROCHLORIDE 75 MG/1
75 CAPSULE, EXTENDED RELEASE ORAL DAILY
Qty: 90 CAPSULE | Refills: 0 | Status: SHIPPED | OUTPATIENT
Start: 2020-10-29 | End: 2021-01-26

## 2020-10-29 NOTE — PROGRESS NOTES
This is a telephone visit due to the covid 10 pandemic.  She gives permission for the visit to take place over the phone.      Cc:  Recheck on depression and anxiety    She continues to have temporary custody of her grandchildren.  Says she is dealing with them having issues with them having separation issues from their mother. Says she will do whatever it takes to take care of them.  Says her depression and anxiety are stable.  She says her anxiety is situational.  She denies any negative side effects to the meds.  Sleeping well at night.  No medical stressors.  Pleased with meds.        Treatment plan: she is to continue her present medications.  Will RTC in 12 weeks.  Sooner if needed.  She is also to continue therapy with Jovany Ellis.      This visit has been rescheduled as a phone visit to comply with patient safety concerns in accordance with CDC recommendations. Total time of discussion was 15 minutes.

## 2020-11-11 ENCOUNTER — OFFICE VISIT (OUTPATIENT)
Dept: PSYCHIATRY | Facility: CLINIC | Age: 50
End: 2020-11-11

## 2020-11-11 DIAGNOSIS — F41.1 GENERALIZED ANXIETY DISORDER: ICD-10-CM

## 2020-11-11 DIAGNOSIS — F33.1 MAJOR DEPRESSIVE DISORDER, RECURRENT EPISODE, MODERATE (HCC): Primary | ICD-10-CM

## 2020-11-11 PROCEDURE — 90834 PSYTX W PT 45 MINUTES: CPT | Performed by: SOCIAL WORKER

## 2020-11-12 NOTE — PROGRESS NOTES
Date of Service: November 11, 2020  Time In: 3:35 PM  Time Out: 4:20 PM      PROGRESS NOTE  Data:  Sangeeta Aguilar is a 50 y.o. female who met 1: 1 with the undersigned for a regularly scheduled individual outpatient psychotherapy session at [facility] for follow-up of depression and anxiety.  The undersigned and the patient wore masks throughout the session and maintained appropriate distancing.     HPI: Patient reports she continues to be the primary caregiver for her 2 grandchildren and states she is struggling with assisting them with their schoolwork at home.  Patient also reports a 7-year-old grandson has been diagnosed with autism and states she continues to struggle with managing his symptoms and keeping all of his appointments.  Patient also reports patient's mother, who recently moved to [place], continues to call and make statements that she is planning to get the children back.  However, the patient states she has emergency custody and the children's mother will have to go through the court system.  Patient reports ongoing depression as evidenced by depressed mood, anhedonia, anergia, periods of hopelessness, difficulty concentrating, and periods of social isolation.  Patient rates current symptoms of depression at a 5 on a scale 1-10 with 10 being most severe.  However, she states she feels she is doing relatively well with depression other than feeling overwhelmed at times.  Patient also continues to struggle with anxiety including anxious mood, feeling on edge, feeling overwhelmed, increased heart rate, muscle tension, and urge to escape the situation and a sense of impending doom.  Patient rates current symptoms of anxiety at 6 on scale 1-10 with 10 being most severe.     Patient reports she continues to adhere to medication regimen as prescribed.  Patient adamantly and convincingly denies suicidal ideation vehemently denies any substance use.      Clinical Maneuvering/Intervention:  Assisted  patient in processing above session content; acknowledged and normalized patient’s thoughts, feelings, and concerns.  Allow the patient to discuss/vent her feelings and frustrations concerning her son and his estranged wife's apparent inability or unwillingness to care for their children which has resulted in her being the primary caregiver for her 2 grandchildren and validated her feelings.  Also utilized motivational reviewing techniques including complex reflections to discussed concept of things we can control things we cannot control and strongly urged patient to remind herself she cannot take on responsibility for her own decisions or behaviors of others.  Further utilize cognitive behavioral therapy and motivational reviewing techniques to discussed the importance of self-care and strongly urged the patient to actively seek periods of time, even if brief, for herself.  Continue to focus on healthy skills of daily living and behavioral activation.  Provided unconditional positive regard in a safe, supportive environment.      Allowed patient to freely discuss issues without interruption or judgment. Provided safe, confidential environment to facilitate the development of positive therapeutic relationship and encourage open, honest communication. Assisted patient in identifying risk factors which would indicate the need for higher level of care including thoughts to harm self or others and/or self-harming behavior and encouraged patient to contact this office, call 911, or present to the nearest emergency room should any of these events occur. Discussed crisis intervention services and means to access.  Patient adamantly and convincingly denies current suicidal or homicidal ideation or perceptual disturbance.    Assessment    Patient continues to struggle with  depression and anxiety which continue to be exacerbated by significant psychosocial stressors including ongoing difficulties with family members and  recently becoming the sole caretaker of HER-2 young grandchildren, 1 of which she reports has been diagnosed with autism spectrum disorder.  As a result, the patient can be reasonably expected to continue to benefit from treatment and would likely be at increased risk for decompensation.    Diagnoses and all orders for this visit:    1. Major depressive disorder, recurrent episode, moderate (CMS/HCC) (Primary)    2. Generalized anxiety disorder               Mental Status Exam  Hygiene:  good  Dress:  casual  Attitude:  Cooperative  Motor Activity:  Appropriate  Speech:  Normal  Mood:  depressed  Affect:  depressed  Thought Processes:  Linear  Thought Content:  normal  Suicidal Thoughts:  denies  Homicidal Thoughts:  denies  Crisis Safety Plan: yes, to come to the emergency room.  Hallucinations:  denies    Patient's Support Network Includes:  extended family    Progress toward goal: Not at goal    Functional Status: Moderate impairment     Prognosis: Fair with Ongoing Treatment     Plan         Patient will continue in outpatient therapy session at [facility] in approximately 4 weeks.  Patient will continue in pharmacotherapy as scheduled with YURIY Morton.    Patient will adhere to medication regimen as prescribed and report any side effects. Patient will contact this office, call 911 or present to the nearest emergency room should suicidal or homicidal ideations occur. Provide Cognitive Behavioral Therapy and Integrative Therapy to improve functioning, maintain stability, and avoid decompensation and the need for higher level of care.          Return in about 4 weeks (around 12/9/2020) for Next scheduled follow up.      This document signed by Jovany Ellis LCSW, [facility] November 12, 2020 07:12 EST

## 2021-01-06 ENCOUNTER — OFFICE VISIT (OUTPATIENT)
Dept: PSYCHIATRY | Facility: CLINIC | Age: 51
End: 2021-01-06

## 2021-01-06 DIAGNOSIS — F33.1 MAJOR DEPRESSIVE DISORDER, RECURRENT EPISODE, MODERATE (HCC): Primary | ICD-10-CM

## 2021-01-06 DIAGNOSIS — F41.1 GENERALIZED ANXIETY DISORDER: ICD-10-CM

## 2021-01-06 PROCEDURE — 90834 PSYTX W PT 45 MINUTES: CPT | Performed by: SOCIAL WORKER

## 2021-01-20 NOTE — PROGRESS NOTES
"Date of Service: January 6, 2021  Time In: 1:10 PM  Time Out: 1:50 PM      PROGRESS NOTE  Data:  Sangeeta Aguilar is a 50 y.o. female who met 1: 1 with the undersigned for a regularly scheduled individual outpatient psychotherapy session at [facility] for follow-up of depression and anxiety.  The undersigned and the patient wore masks throughout the session and maintained appropriate distancing.     HPI: Patient reports she continues to be the primary caregiver for her 2 grandchildren and states she is struggling with assisting them with their schoolwork at home.  Patient also reports she continues to struggle with her adult children and states her grandchildren's father, who is her son, continues to struggle to be an appropriate father and states that her mother who is in [place] is also \"useless\".  Patient also reports her middle son is in [place] and states she has not had contact and is not sure of his status.  Patient reports ongoing depression as evidenced by depressed mood, anhedonia, anergia, periods of hopelessness, difficulty concentrating, and periods of social isolation.  Patient rates current symptoms of depression at a 5 on a scale 1-10 with 10 being most severe.  However, she states she feels she is doing relatively well with depression other than feeling overwhelmed at times.  Patient also continues to struggle with anxiety including anxious mood, feeling on edge, feeling overwhelmed, increased heart rate, muscle tension, and urge to escape the situation and a sense of impending doom.  Patient rates current symptoms of anxiety at 6 on scale 1-10 with 10 being most severe.     Patient reports she continues to adhere to medication regimen as prescribed.  Patient adamantly and convincingly denies suicidal ideation vehemently denies any substance use.      Clinical Maneuvering/Intervention:  Assisted patient in processing above session content; acknowledged and normalized patient’s thoughts, " feelings, and concerns.  Allow the patient to discuss/manner feeling frustration due to significant issues she has had with her children which has resulted in her being the sole caretaker of her 2 young grandchildren and validated her feelings.  Also utilized motivational interviewing techniques including complex reflections to discussed concept of things we can control things we cannot control and strongly encouraged the patient to remind her so she cannot control the decisions or behaviors of others.  However, also validated her belief that if she is going to be the sole caretaker of her grandchildren she should have full custody so that she can take appropriate steps to care for them.  Utilize cognitive behavioral therapy to assist patient in developing appropriate coping mechanisms decrease in severity and frequency of symptoms.  Also discussed the importance of self-care and encouraged patient to look for ways to find time for herself to maintain her own mental stability.  Provided unconditional positive regard in a safe, supportive environment.      Allowed patient to freely discuss issues without interruption or judgment. Provided safe, confidential environment to facilitate the development of positive therapeutic relationship and encourage open, honest communication. Assisted patient in identifying risk factors which would indicate the need for higher level of care including thoughts to harm self or others and/or self-harming behavior and encouraged patient to contact this office, call 911, or present to the nearest emergency room should any of these events occur. Discussed crisis intervention services and means to access.  Patient adamantly and convincingly denies current suicidal or homicidal ideation or perceptual disturbance.    Assessment    Patient continues to struggle with  depression and anxiety which continue to be exacerbated by significant psychosocial stressors including ongoing difficulties  with family members and recently becoming the sole caretaker of HER-2 young grandchildren, 1 of which she reports has been diagnosed with autism spectrum disorder.  As a result, the patient can be reasonably expected to continue to benefit from treatment and would likely be at increased risk for decompensation.    Diagnoses and all orders for this visit:    1. Major depressive disorder, recurrent episode, moderate (CMS/HCC) (Primary)    2. Generalized anxiety disorder               Mental Status Exam  Hygiene:  good  Dress:  casual  Attitude:  Cooperative  Motor Activity:  Appropriate  Speech:  Normal  Mood:  depressed  Affect:  depressed  Thought Processes:  Linear  Thought Content:  normal  Suicidal Thoughts:  denies  Homicidal Thoughts:  denies  Crisis Safety Plan: yes, to come to the emergency room.  Hallucinations:  denies    Patient's Support Network Includes:  extended family    Progress toward goal: Not at goal    Functional Status: Moderate impairment     Prognosis: Fair with Ongoing Treatment     Plan         Patient will continue in outpatient therapy session at [facility] in approximately 4 weeks.  Patient will continue in pharmacotherapy as scheduled with YURIY Morton.    Patient will adhere to medication regimen as prescribed and report any side effects. Patient will contact this office, call 911 or present to the nearest emergency room should suicidal or homicidal ideations occur. Provide Cognitive Behavioral Therapy and Integrative Therapy to improve functioning, maintain stability, and avoid decompensation and the need for higher level of care.          Return in about 4 weeks (around 2/3/2021) for Next scheduled follow up.      This document signed by Jovany Ellis LCSW, [facility] January 20, 2021 07:34 EST

## 2021-01-25 DIAGNOSIS — F33.1 MAJOR DEPRESSIVE DISORDER, RECURRENT EPISODE, MODERATE (HCC): ICD-10-CM

## 2021-01-25 DIAGNOSIS — F41.1 GENERALIZED ANXIETY DISORDER: ICD-10-CM

## 2021-01-26 RX ORDER — VENLAFAXINE HYDROCHLORIDE 75 MG/1
CAPSULE, EXTENDED RELEASE ORAL
Qty: 90 CAPSULE | Refills: 4 | Status: SHIPPED | OUTPATIENT
Start: 2021-01-26 | End: 2021-09-08 | Stop reason: SDUPTHER

## 2021-01-26 RX ORDER — VENLAFAXINE HYDROCHLORIDE 150 MG/1
CAPSULE, EXTENDED RELEASE ORAL
Qty: 90 CAPSULE | Refills: 4 | Status: SHIPPED | OUTPATIENT
Start: 2021-01-26 | End: 2021-09-08 | Stop reason: SDUPTHER

## 2021-01-26 RX ORDER — BUSPIRONE HYDROCHLORIDE 30 MG/1
TABLET ORAL
Qty: 180 TABLET | Refills: 4 | Status: SHIPPED | OUTPATIENT
Start: 2021-01-26 | End: 2021-09-08 | Stop reason: SDUPTHER

## 2021-02-01 ENCOUNTER — OFFICE VISIT (OUTPATIENT)
Dept: PSYCHIATRY | Facility: CLINIC | Age: 51
End: 2021-02-01

## 2021-02-01 DIAGNOSIS — F41.1 GENERALIZED ANXIETY DISORDER: ICD-10-CM

## 2021-02-01 DIAGNOSIS — F33.1 MAJOR DEPRESSIVE DISORDER, RECURRENT EPISODE, MODERATE (HCC): ICD-10-CM

## 2021-02-01 PROCEDURE — 99442 PR PHYS/QHP TELEPHONE EVALUATION 11-20 MIN: CPT | Performed by: NURSE PRACTITIONER

## 2021-02-01 NOTE — PROGRESS NOTES
This is a telephone visit due to the covid 10 pandemic.  She gives permission for the visit to take place over the phone.  It is snowing outside and she does not like to drive in the snow.      Cc:  Recheck on depression and anxiety  She states she continues with situational anxiety.  Says her son does not follow through with his promises to his children and this is stressful.  Says her grandson then asked her who is going to watch them when she leaves them.  She says her 5 year old granddaughter is acting out some as well,.  Says her current medication continues to work fine.  No negative side effects to the medication.  She denies any depression just more situational things.  Sleeping well at night without difficulty.  No current medical stressors.  Mood is stable.         Treatment plan: she is to continue Effexor for the depression and anxiety and the BuSpar for the anxiety.  She does not require refills today.  Those were sent in last week.  We had a long discussion about her grandchildren and they are probably acting out to try and get her attention more.  We discussed ways that she can deal with this such as rewarding a child with some quality time with her mother's cells such as going to get ice cream, going to the grocery etc. and using this as a reward system for their behavior.  She agrees that they are probably just looking for attention.  She is also to continue therapy with Jovany Ellis.  We will notify me sooner should any problems develop.  Otherwise I will have her return to clinic in 3 months.  Patient screened negative for depression based on a PHQ-9 score of 0 on 2/1/2021. Follow-up recommendations include: Prescribed antidepressant medication treatment.    This visit has been rescheduled as a phone visit to comply with patient safety concerns in accordance with CDC recommendations. Total time of discussion was 15 minutes.

## 2021-02-17 ENCOUNTER — TELEMEDICINE (OUTPATIENT)
Dept: PSYCHIATRY | Facility: CLINIC | Age: 51
End: 2021-02-17

## 2021-02-17 ENCOUNTER — TELEPHONE (OUTPATIENT)
Dept: PSYCHIATRY | Facility: CLINIC | Age: 51
End: 2021-02-17

## 2021-02-17 DIAGNOSIS — F41.1 GENERALIZED ANXIETY DISORDER: ICD-10-CM

## 2021-02-17 DIAGNOSIS — F33.1 MAJOR DEPRESSIVE DISORDER, RECURRENT EPISODE, MODERATE (HCC): Primary | ICD-10-CM

## 2021-02-17 PROCEDURE — 90832 PSYTX W PT 30 MINUTES: CPT | Performed by: SOCIAL WORKER

## 2021-02-17 NOTE — PROGRESS NOTES
"Date of Service: February 17, 2021  Time In: 12:35 PM  Time Out: 12:55 PM      PROGRESS NOTE  Data:  Sangeeta Aguilar is a 50 y.o. female who met 1: 1 with the undersigned for a regularly scheduled individual outpatient psychotherapy session via telephone for follow-up of depression and anxiety.    You have chosen to receive care through a telephone visit. Do you consent to use a telephone visit for your medical care today? Yes  This visit has been rescheduled as a phone visit to comply with patient safety concerns in accordance with CDC recommendations. Total time of discussion was 20 minutes.  [place] [address]. The provider identified himself as well as his credentials.   The Patient is at her home using her phone because problems with video connection. The patient's condition being diagnosed/treated is appropriate for telemedicine. The patient gave consent to be seen remotely, and when consent is given they understand that the consent allows for patient identifiable information to be sent to a third party as needed.   They may refuse to be seen remotely at any time. The electronic data is encrypted and password protected, and the patient has been advised of the potential risks to privacy not withstanding such measures.       HPI: Patient states things are going \"okay\" states she continues to struggle with distance learning of her grandson.  She states she continues to be the primary caregiver for her 2 grandchildren which is stressful although she loves them.  The patient states she continues to worry about her oldest son who continues to live in [place] with his partner and she feels he continues to be at risk and then and he is in a very controlling relationship.  Patient reports ongoing depression as evidenced by depressed mood, anhedonia, anergia, periods of hopelessness, difficulty concentrating, and periods of social isolation.  Patient rates " current symptoms of depression at a 4/5 on a scale 1-10 with 10 being most severe.  However, she states she feels she is doing relatively well with depression other than feeling overwhelmed at times.  Patient also continues to struggle with anxiety including anxious mood, feeling on edge, feeling overwhelmed, increased heart rate, muscle tension, and urge to escape the situation and a sense of impending doom.  Patient rates current symptoms of anxiety at a 5 on scale 1-10 with 10 being most severe.     Patient reports she continues to adhere to medication regimen as prescribed.  Patient adamantly and convincingly denies suicidal ideation vehemently denies any substance use.      Clinical Maneuvering/Intervention:  Assisted patient in processing above session content; acknowledged and normalized patient’s thoughts, feelings, and concerns.  Allow the patient to discuss/process her feelings and frustrations that her son is not able to slowly care for his children and she must raise her grandchildren and be their primary caretaker and validated her feelings.  Also utilize motivational interviewing techniques including complex reflection to discuss the concept of things we can control things he cannot control and encouraged patient to remind her so she cannot be responsible for the decisions or behaviors of others, not even her children.  Further encouraged patient to remind herself she cannot control her oldest son's behaviors and also cannot remove consequences of his actions.  Utilize cognitive behavioral therapy to assist patient in developing appropriate coping mechanisms to decrease the severity and frequency symptoms.  Provided unconditional positive regard in a safe, supportive environment.      Allowed patient to freely discuss issues without interruption or judgment. Provided safe, confidential environment to facilitate the development of positive therapeutic relationship and encourage open, honest  communication. Assisted patient in identifying risk factors which would indicate the need for higher level of care including thoughts to harm self or others and/or self-harming behavior and encouraged patient to contact this office, call 911, or present to the nearest emergency room should any of these events occur. Discussed crisis intervention services and means to access.  Patient adamantly and convincingly denies current suicidal or homicidal ideation or perceptual disturbance.    Assessment    Patient continues to struggle with  depression and anxiety which continue to be exacerbated by significant psychosocial stressors including ongoing difficulties with family members and recently becoming the sole caretaker of HER-2 young grandchildren, 1 of which she reports has been diagnosed with autism spectrum disorder.  As a result, the patient can be reasonably expected to continue to benefit from treatment and would likely be at increased risk for decompensation.    Diagnoses and all orders for this visit:    1. Major depressive disorder, recurrent episode, moderate (CMS/HCC) (Primary)    2. Generalized anxiety disorder               Mental Status Exam  Hygiene:  good  Dress:  casual  Attitude:  Cooperative  Motor Activity:  Appropriate  Speech:  Normal  Mood:  depressed  Affect:  depressed  Thought Processes:  Linear  Thought Content:  normal  Suicidal Thoughts:  denies  Homicidal Thoughts:  denies  Crisis Safety Plan: yes, to come to the emergency room.  Hallucinations:  denies    Patient's Support Network Includes:  extended family    Progress toward goal: Not at goal    Functional Status: Moderate impairment     Prognosis: Fair with Ongoing Treatment     Plan         Patient will continue in outpatient therapy session at [facility] in approximately 4 weeks.  Patient will continue in pharmacotherapy as scheduled with YURIY Morton.    Patient will adhere to medication regimen as prescribed and report  any side effects. Patient will contact this office, call 911 or present to the nearest emergency room should suicidal or homicidal ideations occur. Provide Cognitive Behavioral Therapy and Integrative Therapy to improve functioning, maintain stability, and avoid decompensation and the need for higher level of care.          Return in about 3 weeks (around 3/10/2021) for Next scheduled follow up.      This document signed by Jovany Ellis LCSW, [facility] February 17, 2021 13:12 EST

## 2021-04-14 ENCOUNTER — OFFICE VISIT (OUTPATIENT)
Dept: PSYCHIATRY | Facility: CLINIC | Age: 51
End: 2021-04-14

## 2021-04-14 DIAGNOSIS — F33.1 MAJOR DEPRESSIVE DISORDER, RECURRENT EPISODE, MODERATE (HCC): Primary | ICD-10-CM

## 2021-04-14 DIAGNOSIS — F41.1 GENERALIZED ANXIETY DISORDER: ICD-10-CM

## 2021-04-14 PROCEDURE — 90834 PSYTX W PT 45 MINUTES: CPT | Performed by: SOCIAL WORKER

## 2021-04-15 NOTE — PROGRESS NOTES
Date of Service: April 14, 2021  Time In: 10:45 AM  Time Out: 11:25 AM      PROGRESS NOTE  Data:  Sangeeta Aguilar is a 50 y.o. female who met 1: 1 with the undersigned for a regularly scheduled individual outpatient psychotherapy session at the [facility] for follow-up of depression and anxiety.  The patient and the undersigned wore masks throughout the session and maintained appropriate distancing.       HPI: Patient reports she continues to struggle to raise her 2 grandchildren although she loves them and states her grandson continues to struggle in school due to his autism spectrum disorder.  Patient also reports she continues to struggle with her health and states her rheumatologist reports some of the indicators of her severe arthritis are increasing and she is not sure of the implications of this at this time.  Patient states she is awaiting further appointments and assessment.  Patient reports ongoing depression as evidenced by depressed mood, anhedonia, anergia, periods of hopelessness, difficulty concentrating, and periods of social isolation.  Patient rates current symptoms of depression at a 6 on a scale 1-10 with 10 being most severe.  However, she states she feels she is doing relatively well with depression other than feeling overwhelmed at times.  Patient also continues to struggle with anxiety including anxious mood, feeling on edge, feeling overwhelmed, increased heart rate, muscle tension, and urge to escape the situation and a sense of impending doom.  Patient rates current symptoms of anxiety at a 5 on scale 1-10 with 10 being most severe.     Patient reports she continues to adhere to medication regimen as prescribed.  Patient adamantly and convincingly denies suicidal ideation vehemently denies any substance use.      Clinical Maneuvering/Intervention:  Assisted patient in processing above session content; acknowledged and normalized patient’s thoughts, feelings, and concerns.  Allow the  patient to discuss/process her feelings concerning being the primary caregiver for her 2 grandchildren and her fear that her health will preclude her from being able to raise them to adulthood and validated her feelings.  Also utilized motivational interviewing techniques including complex reflections to discussed concept of things we can control things we cannot control and strongly urged the patient to remind herself there simply are things that she is unable to control and to make a concerted effort to stop worrying about them.  Continue to utilize cognitive behavioral therapy to assist the patient in developing appropriate coping mechanisms to decrease the severity and frequency of symptoms.  Also encouraged the patient to utilize support of her ex- to allow her to have at least some time to herself throughout the week and to focus more on self-care.  Provided unconditional positive regard in a safe, supportive environment.      Allowed patient to freely discuss issues without interruption or judgment. Provided safe, confidential environment to facilitate the development of positive therapeutic relationship and encourage open, honest communication. Assisted patient in identifying risk factors which would indicate the need for higher level of care including thoughts to harm self or others and/or self-harming behavior and encouraged patient to contact this office, call 911, or present to the nearest emergency room should any of these events occur. Discussed crisis intervention services and means to access.  Patient adamantly and convincingly denies current suicidal or homicidal ideation or perceptual disturbance.    Assessment    Patient continues to struggle with  depression and anxiety which continue to be exacerbated by significant psychosocial stressors including ongoing difficulties with family members and recently becoming the sole caretaker of HER-2 young grandchildren, 1 of which she reports has  been diagnosed with autism spectrum disorder.  As a result, the patient can be reasonably expected to continue to benefit from treatment and would likely be at increased risk for decompensation.    Diagnoses and all orders for this visit:    1. Major depressive disorder, recurrent episode, moderate (CMS/HCC) (Primary)    2. Generalized anxiety disorder               Mental Status Exam  Hygiene:  good  Dress:  casual  Attitude:  Cooperative  Motor Activity:  Appropriate  Speech:  Normal  Mood:  depressed  Affect:  depressed  Thought Processes:  Linear  Thought Content:  normal  Suicidal Thoughts:  denies  Homicidal Thoughts:  denies  Crisis Safety Plan: yes, to come to the emergency room.  Hallucinations:  denies    Patient's Support Network Includes:  extended family    Progress toward goal: Not at goal    Functional Status: Moderate impairment     Prognosis: Fair with Ongoing Treatment     Plan         Patient will continue in outpatient therapy session at [facility] in approximately 4 weeks.  Patient will continue in pharmacotherapy as scheduled with YURIY Morton.    Patient will adhere to medication regimen as prescribed and report any side effects. Patient will contact this office, call 911 or present to the nearest emergency room should suicidal or homicidal ideations occur. Provide Cognitive Behavioral Therapy and Integrative Therapy to improve functioning, maintain stability, and avoid decompensation and the need for higher level of care.          Return in about 3 weeks (around 5/5/2021) for Next scheduled follow up.      This document signed by Jovany Ellis LCSW, ZAHIDA April 15, 2021 06:54 EDT

## 2021-05-03 ENCOUNTER — OFFICE VISIT (OUTPATIENT)
Dept: PSYCHIATRY | Facility: CLINIC | Age: 51
End: 2021-05-03

## 2021-05-03 DIAGNOSIS — F41.1 GENERALIZED ANXIETY DISORDER: ICD-10-CM

## 2021-05-03 DIAGNOSIS — F33.1 MAJOR DEPRESSIVE DISORDER, RECURRENT EPISODE, MODERATE (HCC): Primary | ICD-10-CM

## 2021-05-03 PROCEDURE — 99442 PR PHYS/QHP TELEPHONE EVALUATION 11-20 MIN: CPT | Performed by: NURSE PRACTITIONER

## 2021-05-03 NOTE — PROGRESS NOTES
This is a telephone visit due to the covid 10 pandemic.  She gives permission for the visit to take place over the phone.     Cc:  Recheck on depression and anxiety  Pt states she could not get here today in time for her appointment.  She continues with stressors dealing with her son. He has been incarcerated again.  She continues to care for his children.  Says her depression and anxiety are stable.  They are situational.  No negative side effects to the meds.  No negative side effects to the meds.  Mood is stable.  Sleeping well at night.  Says her issues are currently situational and she is coping with them best she can.      Treatment plan:  She is to continue the effexor for anxiety and depression and the buspar for the anxiety.  Does not need refills today.  Explained to her she will need to be seen in the office on the next visit. She agreed.  Will RTC 2 months or sooner if needed.        This visit has been rescheduled as a phone visit to comply with patient safety concerns in accordance with CDC recommendations. Total time of discussion was 11 minutes.

## 2021-09-08 ENCOUNTER — OFFICE VISIT (OUTPATIENT)
Dept: PSYCHIATRY | Facility: CLINIC | Age: 51
End: 2021-09-08

## 2021-09-08 VITALS
SYSTOLIC BLOOD PRESSURE: 125 MMHG | HEIGHT: 66 IN | HEART RATE: 77 BPM | WEIGHT: 158.6 LBS | BODY MASS INDEX: 25.49 KG/M2 | DIASTOLIC BLOOD PRESSURE: 84 MMHG

## 2021-09-08 DIAGNOSIS — F33.1 MAJOR DEPRESSIVE DISORDER, RECURRENT EPISODE, MODERATE (HCC): Primary | ICD-10-CM

## 2021-09-08 DIAGNOSIS — F41.1 GENERALIZED ANXIETY DISORDER: ICD-10-CM

## 2021-09-08 PROCEDURE — 99214 OFFICE O/P EST MOD 30 MIN: CPT | Performed by: NURSE PRACTITIONER

## 2021-09-08 RX ORDER — VENLAFAXINE HYDROCHLORIDE 75 MG/1
CAPSULE, EXTENDED RELEASE ORAL
Qty: 90 CAPSULE | Refills: 1 | Status: SHIPPED | OUTPATIENT
Start: 2021-09-08 | End: 2022-03-08 | Stop reason: SDUPTHER

## 2021-09-08 RX ORDER — VENLAFAXINE HYDROCHLORIDE 150 MG/1
CAPSULE, EXTENDED RELEASE ORAL
Qty: 90 CAPSULE | Refills: 1 | Status: SHIPPED | OUTPATIENT
Start: 2021-09-08 | End: 2022-03-08 | Stop reason: SDUPTHER

## 2021-09-08 RX ORDER — BUSPIRONE HYDROCHLORIDE 30 MG/1
30 TABLET ORAL 2 TIMES DAILY
Qty: 180 TABLET | Refills: 1 | Status: SHIPPED | OUTPATIENT
Start: 2021-09-08 | End: 2022-03-08 | Stop reason: SDUPTHER

## 2021-09-08 NOTE — PROGRESS NOTES
"      Sd Aguilar is a 51 y.o. female is here today for medication management follow-up.    Chief Complaint: recheck on depression    History of Present Illness:  Pt presents for follow up at the [facility].  She states her depression and anxiety are \"about the same\".  She still has rare panic attack but not often.  No negative side effects to the meds.  She denies any SI, HI, or any AVH.  Sleep is restless at times.  She has moved to [facility] and is enjoying this.  Has custody of her grandchildren.  Recent medical stressor is cyst in breast has to be biopsied.  Body mass index is 25.6 kg/m². no appetite changes.  Mood is stable.  Continues to be pleased with her meds. .                The following portions of the patient's history were reviewed and updated as appropriate: allergies, current medications, past family history, past medical history, past social history, past surgical history and problem list.    Review of Systems   Constitutional: Negative for activity change, appetite change and fatigue.   Eyes: Negative for visual disturbance.   Respiratory: Positive for cough.    Cardiovascular: Negative.    Gastrointestinal: Negative for nausea.   Endocrine: Negative.    Genitourinary: Negative.    Musculoskeletal: Positive for back pain. Negative for arthralgias.   Skin: Negative.    Allergic/Immunologic: Negative.    Neurological: Positive for headaches. Negative for dizziness and seizures.   Hematological: Negative.    Psychiatric/Behavioral: Negative for agitation, behavioral problems, confusion, decreased concentration, dysphoric mood, hallucinations, self-injury, sleep disturbance and suicidal ideas. The patient is not nervous/anxious and is not hyperactive.      Reviewed copied data and there are no changes    Objective   Physical Exam   Constitutional: She is oriented to person, place, and time. She appears well-developed.   Neurological: She is alert and oriented to " "person, place, and time.   Psychiatric: Her speech is normal and behavior is normal. Judgment and thought content normal.   Pleasant and cooperative   Vitals reviewed.    Blood pressure 125/84, pulse 77, height 167.6 cm (66\"), weight 71.9 kg (158 lb 9.6 oz).    Medication List:   Current Outpatient Medications   Medication Sig Dispense Refill   • atenolol (TENORMIN) 25 MG tablet      • atenolol (TENORMIN) 50 MG tablet      • ATROVENT HFA 17 MCG/ACT inhaler      • betamethasone dipropionate (DIPROLENE) 0.05 % cream      • busPIRone (BUSPAR) 30 MG tablet Take 1 tablet by mouth 2 (Two) Times a Day. 180 tablet 1   • DUPIXENT 300 MG/2ML solution prefilled syringe      • gabapentin (NEURONTIN) 600 MG tablet      • HYDROcodone-acetaminophen (NORCO)  MG per tablet      • hydroxychloroquine (PLAQUENIL) 200 MG tablet      • levocetirizine (XYZAL) 5 MG tablet      • omeprazole (priLOSEC) 40 MG capsule      • raNITIdine (ZANTAC) 300 MG tablet      • tiZANidine (ZANAFLEX) 4 MG tablet Take 4 mg by mouth At Night As Needed for Muscle Spasms.     • triamcinolone (KENALOG) 0.1 % cream      • venlafaxine XR (EFFEXOR-XR) 150 MG 24 hr capsule TAKE 1 CAPSULE EVERY DAY ALONG WITH 75MG FOR TOTAL 225MG 90 capsule 1   • venlafaxine XR (EFFEXOR-XR) 75 MG 24 hr capsule Take 1 daily along with the effexor 150mg for total of 225mg 90 capsule 1     No current facility-administered medications for this visit.     Reviewed copied data and there are no changes  '  Mental Status Exam:   Hygiene:   good  Cooperation:  Cooperative  Eye Contact:  Good  Psychomotor Behavior:  Appropriate  Affect:  Full range  Hopelessness: Denies  Speech:  Normal  Thought Process:  Goal directed  Thought Content:  Normal  Suicidal:  None  Homicidal:  None  Hallucinations:  None  Delusion:  None  Memory:  Intact  Orientation:  Person, Place, Time and Situation  Reliability:  good  Insight:  Good  Judgement:  Good  Impulse Control:  Good  Physical/Medical Issues:  " No     Assessment/Plan   Problems Addressed this Visit     None      Visit Diagnoses     Major depressive disorder, recurrent episode, moderate (CMS/HCC)    -  Primary    Relevant Medications    venlafaxine XR (EFFEXOR-XR) 75 MG 24 hr capsule    venlafaxine XR (EFFEXOR-XR) 150 MG 24 hr capsule    busPIRone (BUSPAR) 30 MG tablet    Generalized anxiety disorder        Relevant Medications    venlafaxine XR (EFFEXOR-XR) 75 MG 24 hr capsule    venlafaxine XR (EFFEXOR-XR) 150 MG 24 hr capsule    busPIRone (BUSPAR) 30 MG tablet      Diagnoses       Codes Comments    Major depressive disorder, recurrent episode, moderate (CMS/HCC)    -  Primary ICD-10-CM: F33.1  ICD-9-CM: 296.32     Generalized anxiety disorder     ICD-10-CM: F41.1  ICD-9-CM: 300.02           Functionality: pt having minimal impairment in important areas of daily functioning.  Prognosis:  Good dependent on medication/follow up and treatment plan compliance.  She is currently pleased with medication regimen.   She is going to continue the buspar for anxiety and the Effexor for the anxiety/depression.  Refills have been submitted.  She is to continue therapy with Jovany Ellis.  Continuing efforts to promote the therapeutic alliance, address the patient's issues, and strengthen self awareness, insights, and coping skills.    Patient is agreeable to call the [facility].  Patient is aware to call 911 or go to the nearest ER should begin having SI/HI. RTC 6 months.  Sooner if needed.

## 2021-10-27 ENCOUNTER — OFFICE VISIT (OUTPATIENT)
Dept: PSYCHIATRY | Facility: CLINIC | Age: 51
End: 2021-10-27

## 2021-10-27 DIAGNOSIS — F33.1 MAJOR DEPRESSIVE DISORDER, RECURRENT EPISODE, MODERATE (HCC): Primary | ICD-10-CM

## 2021-10-27 DIAGNOSIS — F41.1 GENERALIZED ANXIETY DISORDER: ICD-10-CM

## 2021-10-27 DIAGNOSIS — Z63.9 FAMILY DYNAMICS PROBLEM: ICD-10-CM

## 2021-10-27 PROCEDURE — 90837 PSYTX W PT 60 MINUTES: CPT | Performed by: SOCIAL WORKER

## 2021-10-27 SDOH — SOCIAL STABILITY - SOCIAL INSECURITY: PROBLEM RELATED TO PRIMARY SUPPORT GROUP, UNSPECIFIED: Z63.9

## 2021-10-27 NOTE — PROGRESS NOTES
Date of Service: October 27, 2021  Time In: 10:15 AM  Time Out: 11:18 AM      PROGRESS NOTE  Data:  Sangeeta Aguilar is a 51 y.o. female who met 1: 1 with the undersigned for a regularly scheduled individual outpatient psychotherapy session at the [facility] for follow-up of depression and anxiety.  The patient and the undersigned wore masks throughout the session and maintained appropriate distancing.       HPI: Patient reports things been difficult as of late and states her son does not seem to want to see or be around his 2 grandchildren that the patient is raising.  Patient also states her son was incarcerated and told her that a demon followed him and his girlfriend and even kicked her out of the car.  She states her son tells her he hears things all the time and that someone sneaks in their home at night and lays in bed with them.  Patient also states she is struggling with her health and states she simply is not feeling well and is having difficulties with her kidney disease and her blood pressure.  Patient states she had an appointment with a nephrologist a few weeks ago but simply did not go and that she is planning to reschedule.  Patient reports ongoing depression as evidenced by depressed mood, anhedonia, anergia, periods of hopelessness, difficulty concentrating, and periods of social isolation.  Patient rates current symptoms of depression at a 6 on a scale 1-10 with 10 being most severe.  However, she states she feels she is doing relatively well with depression other than feeling overwhelmed at times.  Patient also continues to struggle with anxiety including anxious mood, feeling on edge, feeling overwhelmed, increased heart rate, muscle tension, and urge to escape the situation and a sense of impending doom.  Patient rates current symptoms of anxiety at a 6 on scale 1-10 with 10 being most severe.     Patient reports she continues to adhere to medication regimen as prescribed.  Patient adamantly  and convincingly denies suicidal ideation vehemently denies any substance use.      Clinical Maneuvering/Intervention:  Assisted patient in processing above session content; acknowledged and normalized patient’s thoughts, feelings, and concerns.  Allow the patient to discuss/process her feelings and frustrations concerning the fact that her son and his ex-wife neither are involved with the children and she is their sole caretaker.  The patient states she feels a great deal of pressure from being responsible for the a.m. and for trying to raise them.  Also utilized motivational reviewing techniques including complex reflections to assist patient in recognizing she needs to follow through with all of her appointments and her not keeping her appointments will likely exacerbate her medical issues.  Continue to utilize cognitive behavioral therapy to assist the patient in developing appropriate coping mechanisms decrease in severity frequency if  symptoms.  Provided unconditional positive regard in a safe, supportive environment.      Allowed patient to freely discuss issues without interruption or judgment. Provided safe, confidential environment to facilitate the development of positive therapeutic relationship and encourage open, honest communication. Assisted patient in identifying risk factors which would indicate the need for higher level of care including thoughts to harm self or others and/or self-harming behavior and encouraged patient to contact this office, call 911, or present to the nearest emergency room should any of these events occur. Discussed crisis intervention services and means to access.  Patient adamantly and convincingly denies current suicidal or homicidal ideation or perceptual disturbance.    Assessment    Patient continues to struggle with  depression and anxiety which continue to be exacerbated by significant psychosocial stressors including ongoing difficulties with family members and  recently becoming the sole caretaker of her young grandchildren, 1 of which she reports has been diagnosed with autism spectrum disorder.  As a result, the patient can be reasonably expected to continue to benefit from treatment and would likely be at increased risk for decompensation.    Diagnoses and all orders for this visit:    1. Major depressive disorder, recurrent episode, moderate (HCC) (Primary)    2. Generalized anxiety disorder    3. Family dynamics problem               Mental Status Exam  Hygiene:  good  Dress:  casual  Attitude:  Cooperative  Motor Activity:  Appropriate  Speech:  Normal  Mood:  depressed  Affect:  depressed  Thought Processes:  Linear  Thought Content:  normal  Suicidal Thoughts:  denies  Homicidal Thoughts:  denies  Crisis Safety Plan: yes, to come to the emergency room.  Hallucinations:  denies    Patient's Support Network Includes:  extended family    Progress toward goal: Not at goal    Functional Status: Moderate impairment     Prognosis: Fair with Ongoing Treatment     Plan         Patient will continue in outpatient therapy session at [facility] in approximately 4 weeks.  Patient will continue in pharmacotherapy as scheduled with YURIY Morton.    Patient will adhere to medication regimen as prescribed and report any side effects. Patient will contact this office, call 911 or present to the nearest emergency room should suicidal or homicidal ideations occur. Provide Cognitive Behavioral Therapy and Integrative Therapy to improve functioning, maintain stability, and avoid decompensation and the need for higher level of care.          Return in about 4 weeks (around 11/24/2021) for Next scheduled follow up.      This document signed by Jovany Ellis LCSW, ZAHIDA October 27, 2021 12:09 EDT

## 2022-01-26 ENCOUNTER — OFFICE VISIT (OUTPATIENT)
Dept: PSYCHIATRY | Facility: CLINIC | Age: 52
End: 2022-01-26

## 2022-01-26 DIAGNOSIS — F41.1 GENERALIZED ANXIETY DISORDER: ICD-10-CM

## 2022-01-26 DIAGNOSIS — F33.1 MAJOR DEPRESSIVE DISORDER, RECURRENT EPISODE, MODERATE: Primary | ICD-10-CM

## 2022-01-26 PROCEDURE — 90834 PSYTX W PT 45 MINUTES: CPT | Performed by: SOCIAL WORKER

## 2022-02-09 NOTE — PROGRESS NOTES
"Date of Service: January 26, 2022  Time In: 1:15 PM  Time Out: 2:00 PM      PROGRESS NOTE  Data:  Sangeeta Aguilar is a 51 y.o. female who met 1: 1 with the undersigned for a regularly scheduled individual outpatient psychotherapy session at the [facility] for follow-up of depression and anxiety.  The patient and the undersigned wore masks throughout the session and maintained appropriate distancing.       HPI: Patient reports she is doing \"okay\" but that she continues to struggle with raising her 2 grandchildren states she also continues to be stressed due to the COVID-19 pandemic and her feeling that the school is her not taking appropriate precautions.  She also states she has been frustrated as of late as she feels as though her grandchildren has various health issues that she feels is not being addressed appropriately.  Patient reports ongoing depression as evidenced by depressed mood, anhedonia, anergia, periods of hopelessness, difficulty concentrating, and periods of social isolation.  Patient rates current symptoms of depression at a 5/6 on a scale 1-10 with 10 being most severe.  However, she states she feels she is doing relatively well with depression other than feeling overwhelmed at times.  Patient also continues to struggle with anxiety including anxious mood, feeling on edge, feeling overwhelmed, increased heart rate, muscle tension, and urge to escape the situation and a sense of impending doom.  Patient rates current symptoms of anxiety at a 6 on scale 1-10 with 10 being most severe.     Patient reports she continues to adhere to medication regimen as prescribed.  Patient adamantly and convincingly denies suicidal ideation vehemently denies any substance use.      Clinical Maneuvering/Intervention:  Assisted patient in processing above session content; acknowledged and normalized patient’s thoughts, feelings, and concerns.  Allow the patient to discuss/process her feelings and struggles with " raising her 2 grandchildren and validated her feelings.  Also utilized motivational reviewing techniques including complex reflections to point out the fact the patient has done very well with raising her 2 grandchildren and praised her for her efforts and her sacrifices.  Also validated the patient's plans to advocate for her grandchildren's health care and to seek second opinions as she feels that is appropriate.  Continue to utilize cognitive behavioral therapy to assist the patient in developing appropriate coping mechanisms decrease in severity frequency if  symptoms.  Provided unconditional positive regard in a safe, supportive environment.      Allowed patient to freely discuss issues without interruption or judgment. Provided safe, confidential environment to facilitate the development of positive therapeutic relationship and encourage open, honest communication. Assisted patient in identifying risk factors which would indicate the need for higher level of care including thoughts to harm self or others and/or self-harming behavior and encouraged patient to contact this office, call 911, or present to the nearest emergency room should any of these events occur. Discussed crisis intervention services and means to access.  Patient adamantly and convincingly denies current suicidal or homicidal ideation or perceptual disturbance.    Assessment    Patient continues to struggle with  depression and anxiety which continue to be exacerbated by significant psychosocial stressors including ongoing difficulties with family members and recently becoming the sole caretaker of her young grandchildren, 1 of which she reports has been diagnosed with autism spectrum disorder.  As a result, the patient can be reasonably expected to continue to benefit from treatment and would likely be at increased risk for decompensation.    Diagnoses and all orders for this visit:    1. Major depressive disorder, recurrent episode, moderate  ([facility]) (Primary)    2. Generalized anxiety disorder               Mental Status Exam  Hygiene:  good  Dress:  casual  Attitude:  Cooperative  Motor Activity:  Appropriate  Speech:  Normal  Mood:  depressed  Affect:  depressed  Thought Processes:  Linear  Thought Content:  normal  Suicidal Thoughts:  denies  Homicidal Thoughts:  denies  Crisis Safety Plan: yes, to come to the emergency room.  Hallucinations:  denies    Patient's Support Network Includes:  extended family    Progress toward goal: Not at goal    Functional Status: Moderate impairment     Prognosis: Fair with Ongoing Treatment     Plan         Patient will continue in outpatient therapy session at [facility] in approximately 4 weeks.  Patient will continue in pharmacotherapy as scheduled with YURIY Morton.    Patient will adhere to medication regimen as prescribed and report any side effects. Patient will contact this office, call 911 or present to the nearest emergency room should suicidal or homicidal ideations occur. Provide Cognitive Behavioral Therapy and Integrative Therapy to improve functioning, maintain stability, and avoid decompensation and the need for higher level of care.          Return in about 3 weeks (around 2/16/2022) for Next scheduled follow up.      This document signed by Jovany Ellis LCSW, ZAHIDA February 9, 2022 07:13 EST

## 2022-02-23 ENCOUNTER — OFFICE VISIT (OUTPATIENT)
Dept: PSYCHIATRY | Facility: CLINIC | Age: 52
End: 2022-02-23

## 2022-02-23 DIAGNOSIS — F33.1 MAJOR DEPRESSIVE DISORDER, RECURRENT EPISODE, MODERATE: Primary | ICD-10-CM

## 2022-02-23 DIAGNOSIS — F41.1 GENERALIZED ANXIETY DISORDER: ICD-10-CM

## 2022-02-23 DIAGNOSIS — Z63.9 FAMILY DYNAMICS PROBLEM: ICD-10-CM

## 2022-02-23 PROCEDURE — 90834 PSYTX W PT 45 MINUTES: CPT | Performed by: SOCIAL WORKER

## 2022-02-23 SDOH — SOCIAL STABILITY - SOCIAL INSECURITY: PROBLEM RELATED TO PRIMARY SUPPORT GROUP, UNSPECIFIED: Z63.9

## 2022-02-24 NOTE — PROGRESS NOTES
Date of Service: February 23, 2022  Time In: 1:45 PM  Time Out: 2:30 PM      PROGRESS NOTE  Data:  Sangeeta Aguilar is a 51 y.o. female who met 1: 1 with the undersigned for a regularly scheduled individual outpatient psychotherapy session at the [facility] for follow-up of depression and anxiety.  The patient and the undersigned wore masks throughout the session and maintained appropriate distancing.       HPI: Patient reports she is not feeling well simply states she feels tired and rundown.  The patient also reports she is struggling as of late to care for her grandchildren as this takes a great deal of time and effort.  She also reports she is struggling with her son who is her grandchildren's father who continues to be uninvolved.  She also reports a children's mother who lives in [place] continues to state she is working on getting her own place so she can get the children back.  Patient states she is conflicted as she would like for the children to be with her mother but also worries whether or not she is capable of caring for them.  Patient reports ongoing depression as evidenced by depressed mood, anhedonia, anergia, periods of hopelessness, difficulty concentrating, and periods of social isolation.  Patient rates current symptoms of depression at a 5 on a scale 1-10 with 10 being most severe.  However, she states she feels she is doing relatively well with depression other than feeling overwhelmed at times.  Patient also continues to struggle with anxiety including anxious mood, feeling on edge, feeling overwhelmed, increased heart rate, muscle tension, and urge to escape the situation and a sense of impending doom.  Patient rates current symptoms of anxiety at a 6 on scale 1-10 with 10 being most severe.     Patient reports she continues to adhere to medication regimen as prescribed.  Patient adamantly and convincingly denies suicidal ideation vehemently denies any substance use.      Clinical  Maneuvering/Intervention:  Assisted patient in processing above session content; acknowledged and normalized patient’s thoughts, feelings, and concerns.  Allow the patient to discuss/process her feelings and struggles with raising her 2 grandchildren and validated her feelings.  Utilize solution focused therapy and motivational interviewing techniques including complex reflections to assist the patient in recognizing the need for her to take care of herself properly and to develop a plan to make an appointment with her primary care provider to seek further assessment and treatment to address her medical issues.  Also validated the patient's plans to advocate for her grandchildren's health care and to seek second opinions as she feels that is appropriate.  Continue to utilize cognitive behavioral therapy to assist the patient in developing appropriate coping mechanisms decrease in severity frequency if  symptoms.  Provided unconditional positive regard in a safe, supportive environment.      Allowed patient to freely discuss issues without interruption or judgment. Provided safe, confidential environment to facilitate the development of positive therapeutic relationship and encourage open, honest communication. Assisted patient in identifying risk factors which would indicate the need for higher level of care including thoughts to harm self or others and/or self-harming behavior and encouraged patient to contact this office, call 911, or present to the nearest emergency room should any of these events occur. Discussed crisis intervention services and means to access.  Patient adamantly and convincingly denies current suicidal or homicidal ideation or perceptual disturbance.    Assessment    Patient continues to struggle with  depression and anxiety which continue to be exacerbated by significant psychosocial stressors including ongoing difficulties with family members and recently becoming the sole caretaker of her  young grandchildren, 1 of which she reports has been diagnosed with autism spectrum disorder.  As a result, the patient can be reasonably expected to continue to benefit from treatment and would likely be at increased risk for decompensation.    Diagnoses and all orders for this visit:    1. Major depressive disorder, recurrent episode, moderate (HCC) (Primary)    2. Generalized anxiety disorder    3. Family dynamics problem               Mental Status Exam  Hygiene:  good  Dress:  casual  Attitude:  Cooperative  Motor Activity:  Appropriate  Speech:  Normal  Mood:  depressed  Affect:  depressed  Thought Processes:  Linear  Thought Content:  normal  Suicidal Thoughts:  denies  Homicidal Thoughts:  denies  Crisis Safety Plan: yes, to come to the emergency room.  Hallucinations:  denies    Patient's Support Network Includes:  extended family    Progress toward goal: Not at goal    Functional Status: Moderate impairment     Prognosis: Fair with Ongoing Treatment     Plan         Patient will continue in outpatient therapy session at [facility] in approximately 4 weeks.  Patient will continue in pharmacotherapy as scheduled with YURIY Morton.    Patient will adhere to medication regimen as prescribed and report any side effects. Patient will contact this office, call 911 or present to the nearest emergency room should suicidal or homicidal ideations occur. Provide Cognitive Behavioral Therapy and Integrative Therapy to improve functioning, maintain stability, and avoid decompensation and the need for higher level of care.          Return in about 4 weeks (around 3/23/2022) for Next scheduled follow up.      This document signed by Jovany Ellis LCSW, ZAHIDA February 24, 2022 06:32 EST

## 2022-03-08 ENCOUNTER — OFFICE VISIT (OUTPATIENT)
Dept: PSYCHIATRY | Facility: CLINIC | Age: 52
End: 2022-03-08

## 2022-03-08 ENCOUNTER — HOSPITAL ENCOUNTER (OUTPATIENT)
Dept: GENERAL RADIOLOGY | Facility: HOSPITAL | Age: 52
Discharge: HOME | End: 2022-03-08
Admitting: PHYSICIAN ASSISTANT

## 2022-03-08 ENCOUNTER — TRANSCRIBE ORDERS (OUTPATIENT)
Dept: ADMINISTRATIVE | Facility: HOSPITAL | Age: 52
End: 2022-03-08

## 2022-03-08 VITALS
DIASTOLIC BLOOD PRESSURE: 86 MMHG | BODY MASS INDEX: 25.07 KG/M2 | HEIGHT: 66 IN | OXYGEN SATURATION: 98 % | TEMPERATURE: 97.2 F | WEIGHT: 156 LBS | HEART RATE: 68 BPM | SYSTOLIC BLOOD PRESSURE: 152 MMHG

## 2022-03-08 DIAGNOSIS — F33.1 MAJOR DEPRESSIVE DISORDER, RECURRENT EPISODE, MODERATE: ICD-10-CM

## 2022-03-08 DIAGNOSIS — M54.2 CERVICALGIA: Primary | ICD-10-CM

## 2022-03-08 DIAGNOSIS — F41.1 GENERALIZED ANXIETY DISORDER: ICD-10-CM

## 2022-03-08 DIAGNOSIS — M54.2 CERVICALGIA: ICD-10-CM

## 2022-03-08 PROCEDURE — 72050 X-RAY EXAM NECK SPINE 4/5VWS: CPT | Performed by: RADIOLOGY

## 2022-03-08 PROCEDURE — 99214 OFFICE O/P EST MOD 30 MIN: CPT | Performed by: NURSE PRACTITIONER

## 2022-03-08 PROCEDURE — 72050 X-RAY EXAM NECK SPINE 4/5VWS: CPT

## 2022-03-08 RX ORDER — VENLAFAXINE HYDROCHLORIDE 150 MG/1
CAPSULE, EXTENDED RELEASE ORAL
Qty: 90 CAPSULE | Refills: 0 | Status: SHIPPED | OUTPATIENT
Start: 2022-03-08 | End: 2022-09-08 | Stop reason: SDUPTHER

## 2022-03-08 RX ORDER — POTASSIUM CHLORIDE 1.5 G/1.77G
20 POWDER, FOR SOLUTION ORAL DAILY PRN
COMMUNITY

## 2022-03-08 RX ORDER — MONTELUKAST SODIUM 10 MG/1
1 TABLET ORAL DAILY
COMMUNITY

## 2022-03-08 RX ORDER — VENLAFAXINE HYDROCHLORIDE 75 MG/1
CAPSULE, EXTENDED RELEASE ORAL
Qty: 90 CAPSULE | Refills: 0 | Status: SHIPPED | OUTPATIENT
Start: 2022-03-08 | End: 2022-09-08 | Stop reason: SDUPTHER

## 2022-03-08 RX ORDER — PREGABALIN 25 MG/1
1 CAPSULE ORAL EVERY 8 HOURS SCHEDULED
COMMUNITY

## 2022-03-08 RX ORDER — FUROSEMIDE 20 MG/1
20 TABLET ORAL 2 TIMES DAILY PRN
COMMUNITY

## 2022-03-08 RX ORDER — BUSPIRONE HYDROCHLORIDE 30 MG/1
30 TABLET ORAL 2 TIMES DAILY
Qty: 180 TABLET | Refills: 0 | Status: SHIPPED | OUTPATIENT
Start: 2022-03-08 | End: 2022-09-08 | Stop reason: SDUPTHER
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
## 2022-03-11 ENCOUNTER — HOSPITAL ENCOUNTER (OUTPATIENT)
Dept: GENERAL RADIOLOGY | Facility: HOSPITAL | Age: 52
Discharge: HOME | End: 2022-03-11
Admitting: PHYSICIAN ASSISTANT

## 2022-03-11 ENCOUNTER — TRANSCRIBE ORDERS (OUTPATIENT)
Dept: ADMINISTRATIVE | Facility: HOSPITAL | Age: 52
End: 2022-03-11

## 2022-03-11 DIAGNOSIS — M25.572 LEFT ANKLE PAIN, UNSPECIFIED CHRONICITY: ICD-10-CM

## 2022-03-11 DIAGNOSIS — M25.572 LEFT ANKLE PAIN, UNSPECIFIED CHRONICITY: Primary | ICD-10-CM

## 2022-03-11 PROCEDURE — 73610 X-RAY EXAM OF ANKLE: CPT | Performed by: RADIOLOGY

## 2022-03-11 PROCEDURE — 73610 X-RAY EXAM OF ANKLE: CPT

## 2022-03-23 ENCOUNTER — OFFICE VISIT (OUTPATIENT)
Dept: PSYCHIATRY | Facility: CLINIC | Age: 52
End: 2022-03-23

## 2022-03-23 DIAGNOSIS — F33.1 MAJOR DEPRESSIVE DISORDER, RECURRENT EPISODE, MODERATE: ICD-10-CM

## 2022-03-23 DIAGNOSIS — Z63.9 FAMILY DYNAMICS PROBLEM: ICD-10-CM

## 2022-03-23 DIAGNOSIS — F41.1 GENERALIZED ANXIETY DISORDER: Primary | ICD-10-CM

## 2022-03-23 PROCEDURE — 90834 PSYTX W PT 45 MINUTES: CPT | Performed by: SOCIAL WORKER

## 2022-03-23 SDOH — SOCIAL STABILITY - SOCIAL INSECURITY: PROBLEM RELATED TO PRIMARY SUPPORT GROUP, UNSPECIFIED: Z63.9
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
## 2022-04-13 ENCOUNTER — OFFICE VISIT (OUTPATIENT)
Dept: PSYCHIATRY | Facility: CLINIC | Age: 52
End: 2022-04-13

## 2022-04-13 DIAGNOSIS — F33.1 MAJOR DEPRESSIVE DISORDER, RECURRENT EPISODE, MODERATE: ICD-10-CM

## 2022-04-13 DIAGNOSIS — Z63.9 RELATIONSHIP PROBLEMS: ICD-10-CM

## 2022-04-13 DIAGNOSIS — F41.1 GENERALIZED ANXIETY DISORDER: Primary | ICD-10-CM

## 2022-04-13 PROCEDURE — 90834 PSYTX W PT 45 MINUTES: CPT | Performed by: SOCIAL WORKER

## 2022-04-13 SDOH — SOCIAL STABILITY - SOCIAL INSECURITY: PROBLEM RELATED TO PRIMARY SUPPORT GROUP, UNSPECIFIED: Z63.9
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
## 2022-05-18 ENCOUNTER — OFFICE VISIT (OUTPATIENT)
Dept: PSYCHIATRY | Facility: CLINIC | Age: 52
End: 2022-05-18

## 2022-05-18 DIAGNOSIS — F33.1 MAJOR DEPRESSIVE DISORDER, RECURRENT EPISODE, MODERATE: ICD-10-CM

## 2022-05-18 DIAGNOSIS — F41.1 GENERALIZED ANXIETY DISORDER: Primary | ICD-10-CM

## 2022-05-18 DIAGNOSIS — Z63.9 FAMILY DYNAMICS PROBLEM: ICD-10-CM

## 2022-05-18 PROCEDURE — 90832 PSYTX W PT 30 MINUTES: CPT | Performed by: SOCIAL WORKER

## 2022-05-18 SDOH — SOCIAL STABILITY - SOCIAL INSECURITY: PROBLEM RELATED TO PRIMARY SUPPORT GROUP, UNSPECIFIED: Z63.9

## 2022-05-19 ENCOUNTER — OFFICE VISIT (OUTPATIENT)
Dept: UROLOGY | Facility: CLINIC | Age: 52
End: 2022-05-19

## 2022-05-19 VITALS — WEIGHT: 156 LBS | BODY MASS INDEX: 25.07 KG/M2 | HEIGHT: 66 IN

## 2022-05-19 DIAGNOSIS — N39.41 URGENCY INCONTINENCE: ICD-10-CM

## 2022-05-19 DIAGNOSIS — N30.11 INTERSTITIAL CYSTITIS (CHRONIC) WITH HEMATURIA: Primary | ICD-10-CM

## 2022-05-19 DIAGNOSIS — N32.81 DETRUSOR INSTABILITY OF BLADDER: ICD-10-CM

## 2022-05-19 DIAGNOSIS — N39.0 URINARY TRACT INFECTION WITHOUT HEMATURIA, SITE UNSPECIFIED: ICD-10-CM

## 2022-05-19 LAB
BILIRUB BLD-MCNC: ABNORMAL MG/DL
CLARITY, POC: ABNORMAL
COLOR UR: YELLOW
EXPIRATION DATE: ABNORMAL
GLUCOSE UR STRIP-MCNC: NEGATIVE MG/DL
KETONES UR QL: NEGATIVE
LEUKOCYTE EST, POC: ABNORMAL
Lab: ABNORMAL
NITRITE UR-MCNC: NEGATIVE MG/ML
PH UR: 6 [PH] (ref 5–8)
PROT UR STRIP-MCNC: ABNORMAL MG/DL
RBC # UR STRIP: ABNORMAL /UL
SP GR UR: 1.02 (ref 1–1.03)
UROBILINOGEN UR QL: NORMAL

## 2022-05-19 PROCEDURE — 87088 URINE BACTERIA CULTURE: CPT | Performed by: NURSE PRACTITIONER

## 2022-05-19 PROCEDURE — 99214 OFFICE O/P EST MOD 30 MIN: CPT | Performed by: NURSE PRACTITIONER

## 2022-05-19 PROCEDURE — 87086 URINE CULTURE/COLONY COUNT: CPT | Performed by: NURSE PRACTITIONER

## 2022-05-19 PROCEDURE — 87186 SC STD MICRODIL/AGAR DIL: CPT | Performed by: NURSE PRACTITIONER

## 2022-05-19 PROCEDURE — 51798 US URINE CAPACITY MEASURE: CPT | Performed by: NURSE PRACTITIONER

## 2022-05-19 PROCEDURE — 81003 URINALYSIS AUTO W/O SCOPE: CPT | Performed by: NURSE PRACTITIONER

## 2022-05-19 RX ORDER — VIBEGRON 75 MG/1
1 TABLET, FILM COATED ORAL NIGHTLY
Qty: 30 TABLET | Refills: 3 | COMMUNITY
Start: 2022-05-19 | End: 2022-06-18

## 2022-05-19 RX ORDER — PHENAZOPYRIDINE HYDROCHLORIDE 200 MG/1
200 TABLET, FILM COATED ORAL 3 TIMES DAILY PRN
Qty: 20 TABLET | Refills: 0 | Status: SHIPPED | OUTPATIENT
Start: 2022-05-19 | End: 2022-07-20 | Stop reason: SDUPTHER

## 2022-05-19 NOTE — PROGRESS NOTES
"Chief Complaint  INSTERTICIAL CYSTITIS/URINE FREQUENCY/URGENCY (NEW PATIENT WITH IC/ACUTE CYSTITIS WITH HEMATURIA/URGENCY)    Sd Aguilar presents to [facility] GASTROENTEROLOGY & UROLOGY  History of Present Illness    MS DEDRA AGUILAR is a pleasant 51 year Very pleasant patient  Who presents  to clinic today for evaluation, secondary to severe ongoing urinary symptoms of frequency, urgency, dysuria, lower back pain, urinary discomfort, with significant bladder pain and discomfort with urinary symptoms consistent with interstitial cystitis.    Last seen in clinic in 2017 by Ms. Myra Brown, for bladder installations, patient has been lost to follow-up.  She returns to clinic today in apparent discomfort and would like to resume therapy.  On exam today, she reports urinary symptoms  of frequency, urgency, and flank pain consistent with her prior UTIs ongoing for 3 days.      She reports groin pain, vaginal pain that \"feels like a knife stabbing through\" she reports.  She says sometimes it will last a day or 2, he denies any episodes of gross hematuria.  Her urine dipstick today however showed 3+ leukocyte esterase, negative nitrites, she has 1+ bilirubinemia and trace microscopic hematuria.  Her PVR is 0.    . Objective   Vital Signs:  Ht 167.6 cm (65.98\")   Wt 70.8 kg (156 lb)   BMI 25.19 kg/m²   BMI is >= 25 and < 30. (Overweight) The following options were offered after discussion: weight loss educational material (shared in after visit summary), exercise counseling/recommendations and nutrition counseling/recommendations      Physical Exam  Constitutional:       General: She is in acute distress.      Appearance: She is well-developed. She is obese.   HENT:      Head: Normocephalic and atraumatic.   Eyes:      Pupils: Pupils are equal, round, and reactive to light.   Neck:      Thyroid: No thyromegaly.      Trachea: No tracheal deviation.   Cardiovascular:      Rate " and Rhythm: Normal rate and regular rhythm.      Heart sounds: No murmur heard.  Pulmonary:      Effort: Pulmonary effort is normal. No respiratory distress.      Breath sounds: Normal breath sounds. No stridor. No wheezing.   Abdominal:      General: Bowel sounds are normal. There is distension.      Palpations: Abdomen is soft.      Tenderness: There is abdominal tenderness. There is right CVA tenderness and guarding.   Genitourinary:     Labia:         Right: No tenderness.         Left: No tenderness.       Vagina: Normal. No vaginal discharge.      Comments: Soft nontender abdomen with no organomegaly, rigidity, guarding or tenderness.  Normal vaginal orifice.  She has  small leakage with Valsalva.  No significant perineal body abnormalities and a normal external anus.     Musculoskeletal:         General: Tenderness present. No deformity. Normal range of motion.      Cervical back: Normal range of motion.   Skin:     General: Skin is warm and dry.      Capillary Refill: Capillary refill takes less than 2 seconds.      Coloration: Skin is pale.      Findings: No erythema or rash.   Neurological:      Mental Status: She is alert and oriented to person, place, and time.      Cranial Nerves: No cranial nerve deficit.      Sensory: No sensory deficit.      Coordination: Coordination normal.   Psychiatric:         Behavior: Behavior normal.         Thought Content: Thought content normal.         Judgment: Judgment normal.        Result Review :     UA    Urinalysis 5/19/22   Ketones, UA Negative   Leukocytes, UA Large (3+) (A)   (A) Abnormal value                          Assessment and Plan    Diagnoses and all orders for this visit:    1. Interstitial cystitis (chronic) with hematuria (Primary)  -     Vibegron (Gemtesa) 75 MG tablet; Take 1 tablet by mouth Every Night for 30 days.  Dispense: 30 tablet; Refill: 3  -     phenazopyridine (Pyridium) 200 MG tablet; Take 1 tablet by mouth 3 (Three) Times a Day As  Needed for Bladder Spasms.  Dispense: 20 tablet; Refill: 0    2. Urinary tract infection without hematuria, site unspecified  -     POC Urinalysis Dipstick, Automated  -     Urine Culture - Urine, Urine, Clean Catch  -     Vibegron (Gemtesa) 75 MG tablet; Take 1 tablet by mouth Every Night for 30 days.  Dispense: 30 tablet; Refill: 3  -     phenazopyridine (Pyridium) 200 MG tablet; Take 1 tablet by mouth 3 (Three) Times a Day As Needed for Bladder Spasms.  Dispense: 20 tablet; Refill: 0    3. Detrusor instability of bladder  -     Vibegron (Gemtesa) 75 MG tablet; Take 1 tablet by mouth Every Night for 30 days.  Dispense: 30 tablet; Refill: 3  -     phenazopyridine (Pyridium) 200 MG tablet; Take 1 tablet by mouth 3 (Three) Times a Day As Needed for Bladder Spasms.  Dispense: 20 tablet; Refill: 0    4. Urgency incontinence  -     Vibegron (Gemtesa) 75 MG tablet; Take 1 tablet by mouth Every Night for 30 days.  Dispense: 30 tablet; Refill: 3  -     phenazopyridine (Pyridium) 200 MG tablet; Take 1 tablet by mouth 3 (Three) Times a Day As Needed for Bladder Spasms.  Dispense: 20 tablet; Refill: 0    Other orders  -     Bladder Scan       ASSESSMENT  Interstitial cystitis/detrusor instability/acute cystitis with hematuria/dysuria    MS DEDRA ANDERSON is a pleasant 51 year Very pleasant patient  Who presents  to clinic today for evaluation, secondary to severe ongoing urinary symptoms of frequency, urgency, dysuria, lower back pain, urinary discomfort, with significant bladder pain and discomfort with urinary symptoms consistent with interstitial cystitis.  Urine dipstick showed 3+ leukocyte esterase, negative nitrites, she has trace microscopic hematuria.  Her PVR is 0.    we discussed the diagnosis and management of this condition.  I indicated that it was a multifactorial condition with multifactorial symptomatology, Including frequency, urgency, the sensation of urinary tract infection in the absence of a positive  culture, sexual symptomatology including significant dyspareunia.      We discussed treatment options including the importance of making a clinical diagnosis and the cystoscopic findings including Hunner's ulcers etc.  We also discussed medical management including pharmacologic treatment such as amitriptyline, naturopathic treatments such as pumpkin oil and which more aggressive options of Botox injections as well as the relative risks and merits of this.  We also discussed the use of dietary manipulation including the over-the-counter product relief which basically decreases the acid in the urine and avoidance of ascitic-containing foods such as citrus is etc.  Sjogren's syndrome    WE discussed detrusor instability which is an  irritative bladder symptomatology most likely related to factors such as intake of bladder irritants, postinfectious irritation, prolapse, with a very large differential diagnosis.  The mainstay of treatment has been tight cholinergics which basically caused the bladder to have decreased contractility.  We have discussed the side effects of these treatments including dry mouth, double vision, and increasing constipation.  She reports doing well  on oxybutynin for symptomatic relief in the past, but it became ineffective.  Samples GEMTESSA GIVEN X 1 MONTH        PLAN  We resent her urine for culture. I will call her with results if any bacteria growth not sensitive to her current therapy     We discussed starting Macrobid 100 mg PO Daily -Suppressive Therapy if positive bacteria     She has been encouraged to increase her p.o. fluid intake to at least 1 to 2 L daily and avoid bladder irritants such as caffeine products, spicy foods, and citrusy foods.     I recommend concomitant probiotics with treatment with antibiotics to protect the rectal reservoir including over-the-counter yogurt preparations to ally oral pills containing the appropriate probiotics. Patient reports the diligent use  of Probiotics.    She is to also continue other medications for yeast vaginitis, atrophic vaginitis and dysuria as prescribed above.    Will see her back in 1 months for Follow up in clinic and on restart bladder installations-medication sent to her pharmacy(lidocaine, Solu-Medrol, and heparin)    Patient is agreeable to plan of care.      Follow Up   Return in about 1 month (around 6/20/2022) for Next scheduled follow up FOR BLADDER INSTILATION.  Patient was given instructions and counseling regarding her condition or for health maintenance advice. Please see specific information pulled into the AVS if appropriate.

## 2022-05-20 DIAGNOSIS — N30.11 INTERSTITIAL CYSTITIS (CHRONIC) WITH HEMATURIA: Primary | ICD-10-CM

## 2022-05-20 NOTE — TELEPHONE ENCOUNTER
Solu-cortef 100 mg= pt receive qt 4-1 per instillation  Heprin Sodium 10,000 units- patient qt 16- 4 per instillation  Lidocaine 2% 20 ml vials- patient receives  Qt 2-1 per instillation    Called these bladder instillation meds into the pt's pharmacy and called the pt to let her know.

## 2022-05-21 LAB — BACTERIA SPEC AEROBE CULT: ABNORMAL

## 2022-05-23 ENCOUNTER — TELEPHONE (OUTPATIENT)
Dept: UROLOGY | Facility: CLINIC | Age: 52
End: 2022-05-23

## 2022-05-23 DIAGNOSIS — N39.0 E-COLI UTI: ICD-10-CM

## 2022-05-23 DIAGNOSIS — B96.20 E-COLI UTI: ICD-10-CM

## 2022-05-23 DIAGNOSIS — N39.0 URINARY TRACT INFECTION WITHOUT HEMATURIA, SITE UNSPECIFIED: Primary | ICD-10-CM

## 2022-05-23 RX ORDER — NITROFURANTOIN 25; 75 MG/1; MG/1
CAPSULE ORAL
Qty: 56 CAPSULE | Refills: 3 | Status: SHIPPED | OUTPATIENT
Start: 2022-05-23 | End: 2022-06-06

## 2022-05-23 NOTE — TELEPHONE ENCOUNTER
----- Message from Griselda Cheng-Akwa, APRN sent at 5/23/2022  8:21 AM EDT -----  Regarding: UTI POSITIVE  Please call patient with PSA results positive.  I sent Macrobid to her pharmacy to take twice daily x10 days, and then 1 nightly for suppressive therapy.  Follow-up in clinic as directed for urine recheck      Urine Culture   >100,000 CFU/mL Escherichia coli Abnormal        Colonization of the urinary tract without infection is common. Treatment is discouraged unless the patient is symptomatic, pregnant, or undergoing an invasive urologic procedure.      Resulting Agency: [facility]      Susceptibility       Escherichia coli    SEBASTIEN    Ampicillin Resistant    Ampicillin + Sulbactam Resistant    Cefazolin Susceptible    Cefepime Susceptible    Ceftazidime Susceptible    Ceftriaxone Susceptible    Gentamicin Susceptible    Levofloxacin Susceptible    Nitrofurantoin Susceptible    Piperacillin + Tazobactam Susceptible    Trimethoprim + Sulfamethoxazole Susceptible                   ----- Message -----  From: Arlen Boucher MA  Sent: 5/19/2022  10:14 AM EDT  To: Griselda Cheng-Akwa, APRN

## 2022-05-23 NOTE — TELEPHONE ENCOUNTER
Called patient to go over her urine culture which showed ecoli infection. Patient has macrobid at her pharmacy to take that guerita sent in. Patient verbalized understanding. Patient has appt on 6/20/22 to recheck her urine.

## 2022-06-06 ENCOUNTER — TELEPHONE (OUTPATIENT)
Dept: UROLOGY | Facility: CLINIC | Age: 52
End: 2022-06-06

## 2022-06-06 DIAGNOSIS — B96.20 E-COLI UTI: ICD-10-CM

## 2022-06-06 DIAGNOSIS — N39.0 RECURRENT UTI (URINARY TRACT INFECTION): Primary | ICD-10-CM

## 2022-06-06 DIAGNOSIS — N39.0 E-COLI UTI: ICD-10-CM

## 2022-06-06 DIAGNOSIS — N30.11 INTERSTITIAL CYSTITIS (CHRONIC) WITH HEMATURIA: ICD-10-CM

## 2022-06-06 RX ORDER — CEPHALEXIN 250 MG/1
250 CAPSULE ORAL 2 TIMES DAILY
Qty: 40 CAPSULE | Refills: 2 | Status: SHIPPED | OUTPATIENT
Start: 2022-06-06 | End: 2022-06-16

## 2022-06-06 RX ORDER — CEPHALEXIN 250 MG/1
250 CAPSULE ORAL 2 TIMES DAILY
Qty: 40 CAPSULE | Refills: 2 | Status: SHIPPED | OUTPATIENT
Start: 2022-06-06 | End: 2022-06-06 | Stop reason: SDUPTHER

## 2022-06-06 NOTE — TELEPHONE ENCOUNTER
Did call patient back, we discussed concerns pertaining to allergy.  Patient reports she tolerated medication very well for at least 7 days but later on has developed a persistent rash that is itchy, generalized, very bothersome to her.  She stopped the medication on Friday, and her rash and itching has stopped by Sunday she reports.    Discussed changing her medication at this time, will start Keflex 250 p.o. twice daily x10 days, then Keflex 250 mg nightly for antibiotic suppressive therapy.  SHe reports tolerating Keflex in the past despite her penicillin allergy.  Medication sent to her [facility] for pickup.  Patient encouraged to follow-up in clinic as discussed 06/20/2022 for urine recheck.  And agreeable plan of care.    Urine Culture >100,000 CFU/mL Escherichia coli Abnormal           Colonization of the urinary tract without infection is common. Treatment is discouraged unless the patient is symptomatic, pregnant, or undergoing an invasive urologic procedure.        Resulting Agency: [facility]       Susceptibility     Escherichia coli     SEBASTIEN     Ampicillin >=32 ug/ml Resistant     Ampicillin + Sulbactam >=32 ug/ml Resistant     Cefazolin <=4 ug/ml Susceptible     Cefepime <=1 ug/ml Susceptible     Ceftazidime <=1 ug/ml Susceptible     Ceftriaxone <=1 ug/ml Susceptible     Gentamicin <=1 ug/ml Susceptible     Levofloxacin <=0.12 ug/ml Susceptible     Nitrofurantoin <=16 ug/ml Susceptible     Piperacillin + Tazobactam <=4 ug/ml Susceptible     Trimethoprim + Sulfamethoxazole <=20 ug/ml Susceptible

## 2022-06-06 NOTE — TELEPHONE ENCOUNTER
Patient called stating she was prescribed macrobid and she had an allergic reaction to it, she had itching and rash, she stopped the medication but what does she need to do now, she no longer has any symptoms

## 2022-06-20 ENCOUNTER — OFFICE VISIT (OUTPATIENT)
Dept: UROLOGY | Facility: CLINIC | Age: 52
End: 2022-06-20

## 2022-06-20 VITALS — BODY MASS INDEX: 25.07 KG/M2 | WEIGHT: 156 LBS | HEIGHT: 66 IN

## 2022-06-20 DIAGNOSIS — B96.20 E. COLI UTI (URINARY TRACT INFECTION): ICD-10-CM

## 2022-06-20 DIAGNOSIS — N30.10 INTERSTITIAL CYSTITIS: Primary | ICD-10-CM

## 2022-06-20 DIAGNOSIS — N39.0 E. COLI UTI (URINARY TRACT INFECTION): ICD-10-CM

## 2022-06-20 DIAGNOSIS — N39.0 RECURRENT UTI (URINARY TRACT INFECTION): ICD-10-CM

## 2022-06-20 DIAGNOSIS — R35.0 FREQUENCY OF URINATION: ICD-10-CM

## 2022-06-20 LAB
BILIRUB BLD-MCNC: NEGATIVE MG/DL
CLARITY, POC: CLEAR
COLOR UR: YELLOW
EXPIRATION DATE: NORMAL
GLUCOSE UR STRIP-MCNC: NEGATIVE MG/DL
KETONES UR QL: NEGATIVE
LEUKOCYTE EST, POC: NEGATIVE
Lab: NORMAL
NITRITE UR-MCNC: NEGATIVE MG/ML
PH UR: 5 [PH] (ref 5–8)
PROT UR STRIP-MCNC: NEGATIVE MG/DL
RBC # UR STRIP: NEGATIVE /UL
SP GR UR: 1.02 (ref 1–1.03)
UROBILINOGEN UR QL: NORMAL

## 2022-06-20 PROCEDURE — 81003 URINALYSIS AUTO W/O SCOPE: CPT | Performed by: NURSE PRACTITIONER

## 2022-06-20 PROCEDURE — 51700 IRRIGATION OF BLADDER: CPT | Performed by: NURSE PRACTITIONER

## 2022-06-20 PROCEDURE — 87086 URINE CULTURE/COLONY COUNT: CPT | Performed by: NURSE PRACTITIONER

## 2022-06-20 NOTE — PROGRESS NOTES
"Chief Complaint  Cystitis and INTERSTICIAL CYSTITIS/BLADDER PAIN (FOLLOW UP BLADDER INSTALLATION TODAY)    Sd Aguilar presents to [facility] GASTROENTEROLOGY & UROLOGY  History of Present Illness    MS DEDRA AGUILAR is a pleasant 51 year old Very pleasant patient  Who returns  to clinic today for evaluation, secondary to severe ongoing urinary symptoms of frequency, urgency, dysuria, lower back pain, urinary discomfort, with significant bladder pain and discomfort with urinary symptoms consistent with interstitial cystitis.     She is here today to initiate therapy with bladder installations for her cystitis.  Patient reports significant improvement in her symptoms since starting antibiotic therapy.  Currently on suppressive therapy with Keflex 250 mg daily.  Patient denies any side effects from medications.  She returns to clinic today in no apparent discomfort and would like to resume therapy.     She reports significant improvement in her urinary symptoms  of frequency, urgency, and dysuria.  She however still reports flank pain, pelvic pressure and suprapubic discomfort, lower back pain, consistent with her prior UTIs.  However her last urine culture was positive for UTI.  Urine dipstick today is completely negative.  Urine Culture >100,000 CFU/mL Escherichia coli Abnormal        Her urine dipstick today however is completely negative for any  leukocyte esterase, Negative nitrites, is negative for gross/microscopic hematuria.  Her PVR is 0 cc.     Objective   Vital Signs:  Ht 167.6 cm (65.98\")   Wt 70.8 kg (156 lb)   BMI 25.19 kg/m²   Estimated body mass index is 25.19 kg/m² as calculated from the following:    Height as of this encounter: 167.6 cm (65.98\").    Weight as of this encounter: 70.8 kg (156 lb).    BMI is >= 25 and <30. (Overweight) The following options were offered after discussion;: weight loss educational material (shared in after visit summary), " exercise counseling/recommendations and nutrition counseling/recommendations    Physical Exam  Constitutional:       General: She is in acute distress.      Appearance: She is well-developed. She is ill-appearing.   HENT:      Head: Normocephalic and atraumatic.   Eyes:      Pupils: Pupils are equal, round, and reactive to light.   Neck:      Thyroid: No thyromegaly.      Trachea: No tracheal deviation.   Cardiovascular:      Rate and Rhythm: Normal rate and regular rhythm.      Heart sounds: No murmur heard.  Pulmonary:      Effort: Pulmonary effort is normal. No respiratory distress.      Breath sounds: Normal breath sounds. No stridor. No wheezing.   Abdominal:      General: Bowel sounds are normal. There is distension.      Palpations: Abdomen is soft.      Tenderness: There is abdominal tenderness. There is guarding.   Genitourinary:     Labia:         Right: No tenderness.         Left: No tenderness.       Vagina: Normal. No vaginal discharge.   Musculoskeletal:         General: Tenderness present. No deformity. Normal range of motion.      Cervical back: Normal range of motion.   Skin:     General: Skin is warm and dry.      Capillary Refill: Capillary refill takes less than 2 seconds.      Coloration: Skin is pale.      Findings: No erythema or rash.   Neurological:      Mental Status: She is alert and oriented to person, place, and time.      Cranial Nerves: No cranial nerve deficit.      Sensory: No sensory deficit.      Motor: Weakness present.      Coordination: Coordination normal.   Psychiatric:         Behavior: Behavior normal.         Thought Content: Thought content normal.         Judgment: Judgment normal.        Result Review :    UA    Urinalysis 5/19/22 6/20/22   Ketones, UA Negative Negative   Leukocytes, UA Large (3+) (A) Negative   (A) Abnormal value            Urine Culture    Urine Culture 5/19/22 6/20/22   Urine Culture >100,000 CFU/mL Escherichia coli (A) No growth   (A) Abnormal value                       Assessment and Plan   Diagnoses and all orders for this visit:    1. Interstitial cystitis (Primary)  -     cephalexin (KEFLEX) 250 MG capsule; Take 1 capsule by mouth 4 (Four) Times a Day for 10 days.  Dispense: 40 capsule; Refill: 0    2. Recurrent UTI (urinary tract infection)  -     Urine Culture - Urine, Urine, Clean Catch  -     heparin (porcine) 10,000 Units, sodium bicarbonate 4.2 % 2.5 mEq, lidocaine (XYLOCAINE) 2% 10 mL irrigation  -     Cancel: POC Urinalysis Dipstick, Automated  -     cephalexin (KEFLEX) 250 MG capsule; Take 1 capsule by mouth 4 (Four) Times a Day for 10 days.  Dispense: 40 capsule; Refill: 0    3. E. coli UTI (urinary tract infection)  -     cephalexin (KEFLEX) 250 MG capsule; Take 1 capsule by mouth 4 (Four) Times a Day for 10 days.  Dispense: 40 capsule; Refill: 0    4. Frequency of urination  -     POC Urinalysis Dipstick, Automated  -     heparin (porcine) 10,000 Units, sodium bicarbonate 4.2 % 2.5 mEq, lidocaine (XYLOCAINE) 2% 10 mL irrigation  -     Cancel: POC Urinalysis Dipstick, Automated           ASSESSMENT  INTERSTITIAL CYSTITIS/ ACUTE CYSTITIS WITH HEMATURIA/ECOLI UTI  MS DEDRA ANDERSON is a pleasant 51 year old Very pleasant patient  Who returns  to clinic today for evaluation, secondary to severe ongoing urinary symptoms of frequency, urgency, dysuria, lower back pain, urinary discomfort, with significant bladder pain and discomfort with urinary symptoms consistent with interstitial cystitis.she has been on antibiotic suppressive therapy with Keflex 250 mg daily, Her urine dipstick today however is completely negative for any  leukocyte esterase, Negative nitrites, is negative for gross/microscopic hematuria.  Her PVR is 0 cc    Insterstitial Cystitis/Bladder Instillations.Patient tolerated her instillation of 10CC of 2% lidocaine, Solu-Medrol and heparin well today without any discomfort. She is advised to hold it in for atleast two hours prior to  voiding for maximum benefit.    AGAIN, We discussed IC and Patient was educated on the facts that  Interstitial cystitis is more of a syndrome than an actual disease. We talked about the fact that there is no imaging modality or pathological biopsy that is indicative of interstitial cystitis.  When we don't know the cause of the condition treatments for the condition are widely variable.     We talked about dietary changes that might be helpful to her and a handout was given to her. We discussed Symptoms of urgency, frequency, suprapubic discomfort, bladder spasms and dyspareunia in the absence of any documented infection as the classical description of interstitial cystitis.        PLAN  We resent her urine for culture. I will call her with results if any bacteria growth not sensitive to her current therapy    Will Continue Keflex 250 mg PO Daily -Suppressive Therapy.     She has been encouraged to increase her p.o. fluid intake to at least 1 to 2 L daily and avoid bladder irritants such as caffeine products, spicy foods, and citrusy foods.     I recommend concomitant probiotics with treatment with antibiotics to protect the rectal reservoir including over-the-counter yogurt preparations to ally oral pills containing the appropriate probiotics. Patient reports the diligent use of Probiotics.    She is to also continue other medications for yeast vaginitis, atrophic vaginitis as prescribed above.    Will see her back in 1 months for bladder installations, in clinic and recheck her urinalysis at that time.    Patient is agreeable to plan of care.    Follow Up   Return in about 1 month (around 7/20/2022) for Next scheduled follow up bladder instillation/IC/, RECURRENT UTI/DYSURIA/DETRUSSOR INSTABILITY.  Patient was given instructions and counseling regarding her condition or for health maintenance advice. Please see specific information pulled into the AVS if appropriate.

## 2022-06-22 LAB — BACTERIA SPEC AEROBE CULT: NO GROWTH

## 2022-06-27 RX ORDER — CEPHALEXIN 250 MG/1
250 CAPSULE ORAL 4 TIMES DAILY
Qty: 40 CAPSULE | Refills: 0 | Status: SHIPPED | OUTPATIENT
Start: 2022-06-27 | End: 2022-07-07

## 2022-06-28 DIAGNOSIS — N76.0 ACUTE VAGINITIS: Primary | ICD-10-CM

## 2022-06-28 RX ORDER — FLUCONAZOLE 100 MG/1
100 TABLET ORAL DAILY
Qty: 3 TABLET | Refills: 0 | Status: SHIPPED | OUTPATIENT
Start: 2022-06-28 | End: 2022-07-01

## 2022-06-29 ENCOUNTER — OFFICE VISIT (OUTPATIENT)
Dept: PSYCHIATRY | Facility: CLINIC | Age: 52
End: 2022-06-29

## 2022-06-29 DIAGNOSIS — Z63.9 FAMILY DYNAMICS PROBLEM: ICD-10-CM

## 2022-06-29 DIAGNOSIS — F33.1 MAJOR DEPRESSIVE DISORDER, RECURRENT EPISODE, MODERATE: ICD-10-CM

## 2022-06-29 DIAGNOSIS — F41.1 GENERALIZED ANXIETY DISORDER: Primary | ICD-10-CM

## 2022-06-29 PROCEDURE — 90834 PSYTX W PT 45 MINUTES: CPT | Performed by: SOCIAL WORKER

## 2022-06-29 SDOH — SOCIAL STABILITY - SOCIAL INSECURITY: PROBLEM RELATED TO PRIMARY SUPPORT GROUP, UNSPECIFIED: Z63.9

## 2022-06-29 NOTE — PROGRESS NOTES
"Date of Service: June 29, 2022  Time In: 10:35 AM  Time Out: 11:25 AM      PROGRESS NOTE  Data:  Sangeeta Aguilar is a 51 y.o. female who met 1: 1 with the undersigned for a regularly scheduled individual outpatient psychotherapy session at the [facility] for follow-up of depression and anxiety.  The patient and the undersigned wore masks throughout the session and maintained appropriate distancing.       HPI: Patient states she continues to \"talk to\" her ex- and states although they were very getting along well she is not sure what their future will be.  She also reports she is planning to go see her 2 grandchildren in [place] within the next 2 weeks and states her fear is that they will not be being taken care of appropriately by their mother which resulted in her bringing them back with her.  Patient reports ongoing depression as evidenced by depressed mood, anhedonia, anergia, periods of hopelessness, difficulty concentrating, and periods of social isolation.  Patient rates current symptoms of depression at a 4 on a scale 1-10 with 10 being most severe.   Patient also continues to struggle with anxiety including anxious mood, feeling on edge, feeling overwhelmed, increased heart rate, muscle tension, and urge to escape the situation and a sense of impending doom.  Patient rates current symptoms of anxiety at a 6 on scale 1-10 with 10 being most severe.     Patient reports she continues to adhere to medication regimen as prescribed.  Patient adamantly and convincingly denies suicidal ideation vehemently denies any substance use.      Clinical Maneuvering/Intervention:  Assisted patient in processing above session content; acknowledged and normalized patient’s thoughts, feelings, and concerns.  Allowed the patient to discuss/process her feelings and apprehensions concerning the significant changes she has went through recently in her life.  Assisted the patient with processing her feelings concerning " her feelings of guilt for allowing her grandchildren to go live with her mother after they have lived with her full-time for 3 years and she has essentially helped to raise them since they were born and validated her feelings.  Also encouraged the patient to have a strategy for how she will know whether or not her grandchildren are being taken appropriate care of and encouraged her to remind herself everyone has a right to her own philosophy of raising children as long as they are safe and appropriately taken care of.  Continue to utilize cognitive behavioral therapy to assist the patient in developing appropriate coping mechanisms decrease in severity frequency if  symptoms.  Provided unconditional positive regard in a safe, supportive environment.      Allowed patient to freely discuss issues without interruption or judgment. Provided safe, confidential environment to facilitate the development of positive therapeutic relationship and encourage open, honest communication. Assisted patient in identifying risk factors which would indicate the need for higher level of care including thoughts to harm self or others and/or self-harming behavior and encouraged patient to contact this office, call 911, or present to the nearest emergency room should any of these events occur. Discussed crisis intervention services and means to access.  Patient adamantly and convincingly denies current suicidal or homicidal ideation or perceptual disturbance.    Assessment    Patient continues to struggle with  depression and anxiety which continue to be exacerbated by her grandchildren recently moving back in with her mother in [place] which has caused the patient to feel a sense of remorse and guilt.  As a result, the patient can be reasonably expected to continue to benefit from treatment and would likely be at increased risk for decompensation.    Diagnoses and all orders for this visit:    1. Generalized anxiety disorder  (Primary)    2. Major depressive disorder, recurrent episode, moderate (HCC)    3. Family dynamics problem               Mental Status Exam  Hygiene:  good  Dress:  casual  Attitude:  Cooperative  Motor Activity:  Appropriate  Speech:  Normal  Mood:  depressed  Affect:  depressed  Thought Processes:  Linear  Thought Content:  normal  Suicidal Thoughts:  denies  Homicidal Thoughts:  denies  Crisis Safety Plan: yes, to come to the emergency room.  Hallucinations:  denies    Patient's Support Network Includes:  extended family    Progress toward goal: Not at goal    Functional Status: Moderate impairment     Prognosis: Fair with Ongoing Treatment     Plan         Patient will continue in outpatient therapy session at [facility] in approximately 4 weeks.  Patient will continue in pharmacotherapy as scheduled with YURIY Morton.    Patient will adhere to medication regimen as prescribed and report any side effects. Patient will contact this office, call 911 or present to the nearest emergency room should suicidal or homicidal ideations occur. Provide Cognitive Behavioral Therapy and Integrative Therapy to improve functioning, maintain stability, and avoid decompensation and the need for higher level of care.          Return in about 3 weeks (around 7/20/2022) for Next scheduled follow up.      This document signed by Jovany Ellis LCSW, ZAHIDA June 29, 2022 15:11 EDT

## 2022-07-20 ENCOUNTER — OFFICE VISIT (OUTPATIENT)
Dept: UROLOGY | Facility: CLINIC | Age: 52
End: 2022-07-20

## 2022-07-20 VITALS — BODY MASS INDEX: 24.43 KG/M2 | WEIGHT: 152 LBS | HEIGHT: 66 IN

## 2022-07-20 DIAGNOSIS — N30.11 INTERSTITIAL CYSTITIS (CHRONIC) WITH HEMATURIA: ICD-10-CM

## 2022-07-20 DIAGNOSIS — R39.89 BLADDER PAIN: ICD-10-CM

## 2022-07-20 DIAGNOSIS — N39.41 URGENCY INCONTINENCE: ICD-10-CM

## 2022-07-20 DIAGNOSIS — N39.0 URINARY TRACT INFECTION WITHOUT HEMATURIA, SITE UNSPECIFIED: ICD-10-CM

## 2022-07-20 DIAGNOSIS — N30.01 ACUTE CYSTITIS WITH HEMATURIA: ICD-10-CM

## 2022-07-20 DIAGNOSIS — N30.10 INTERSTITIAL CYSTITIS: Primary | ICD-10-CM

## 2022-07-20 DIAGNOSIS — N32.81 DETRUSOR INSTABILITY OF BLADDER: ICD-10-CM

## 2022-07-20 LAB
BILIRUB BLD-MCNC: NEGATIVE MG/DL
CLARITY, POC: CLEAR
COLOR UR: YELLOW
EXPIRATION DATE: ABNORMAL
GLUCOSE UR STRIP-MCNC: NEGATIVE MG/DL
KETONES UR QL: NEGATIVE
LEUKOCYTE EST, POC: ABNORMAL
Lab: ABNORMAL
NITRITE UR-MCNC: NEGATIVE MG/ML
PH UR: 5 [PH] (ref 5–8)
PROT UR STRIP-MCNC: ABNORMAL MG/DL
RBC # UR STRIP: NEGATIVE /UL
SP GR UR: 1.03 (ref 1–1.03)
UROBILINOGEN UR QL: NORMAL

## 2022-07-20 PROCEDURE — 51700 IRRIGATION OF BLADDER: CPT | Performed by: NURSE PRACTITIONER

## 2022-07-20 PROCEDURE — 99214 OFFICE O/P EST MOD 30 MIN: CPT | Performed by: NURSE PRACTITIONER

## 2022-07-20 PROCEDURE — 87086 URINE CULTURE/COLONY COUNT: CPT | Performed by: NURSE PRACTITIONER

## 2022-07-20 PROCEDURE — 81003 URINALYSIS AUTO W/O SCOPE: CPT | Performed by: NURSE PRACTITIONER

## 2022-07-20 RX ORDER — PHENAZOPYRIDINE HYDROCHLORIDE 200 MG/1
200 TABLET, FILM COATED ORAL 3 TIMES DAILY PRN
Qty: 20 TABLET | Refills: 0 | Status: SHIPPED | OUTPATIENT
Start: 2022-07-20 | End: 2022-09-26 | Stop reason: SDUPTHER

## 2022-07-20 RX ORDER — ONDANSETRON 4 MG/1
4 TABLET, FILM COATED ORAL EVERY 12 HOURS PRN
Qty: 20 TABLET | Refills: 0 | Status: SHIPPED | OUTPATIENT
Start: 2022-07-20 | End: 2022-09-26 | Stop reason: SDUPTHER

## 2022-07-20 NOTE — PROGRESS NOTES
Chief Complaint   Patient presents with   • INTERSTICIAL CYSTITIS /UTI/BLADDER PAIN     MONTHLY FOLLOW UP FOR BLADDER INSTALLATION       Dedra Aguilar is a 51 y.o. female who presents to the office today for follow up appointment for INTERSTICIAL CYSTITIS /UTI/BLADDER PAIN (MONTHLY FOLLOW UP FOR BLADDER INSTALLATION)  .  HPI     MS DEDRA AGUILAR is a pleasant 51 year old Very pleasant patient  Who returns  to clinic today for evaluation/bladder instillation, secondary to severe ongoing urinary symptoms of frequency, urgency, dysuria, lower back pain, urinary discomfort, with significant bladder pain and discomfort with urinary symptoms consistent with interstitial cystitis.     She is here today to initiate therapy with bladder installations(Solu-Medrol, heparin, lidocaine) for her cystitis.  Patient tolerated procedure well without any discomfort. Patient reports significant improvement in her symptoms since starting antibiotic therapy for her last E. coli positive UTI.    She is currently on suppressive therapy with Keflex 250 mg daily.  Patient denies any side effects from medications.  She returns to clinic today in no apparent discomfort and would like to resume therapy.      She reports significant improvement in her urinary symptoms  of frequency, urgency, and dysuria.  She however still reports flank pain, pelvic pressure and suprapubic discomfort, lower back pain, consistent with her prior UTIs.  However her last urine culture on 06/20/2022 was NEGATIVE for UTI.  Patient had E. coli positive urine culture on 05/19/2022-resolved . HER Urine dipstick today however still show trace leukocyte Estrace otherwise is completely negative for any infection, it is negative for gross//microscopic hematuria.  Her PVR is 100 cc.    Review of Systems   Constitutional: Positive for chills and fatigue. Negative for activity change and fever.   HENT: Negative for congestion, sinus pressure and sinus pain.    Eyes:  Negative for discharge and itching.   Respiratory: Negative for apnea, cough, chest tightness, shortness of breath and wheezing.    Cardiovascular: Negative for chest pain and leg swelling.   Gastrointestinal: Positive for nausea. Negative for abdominal distention, abdominal pain and vomiting.   Endocrine: Negative for cold intolerance and heat intolerance.   Genitourinary: Positive for dysuria, flank pain, frequency and pelvic pain. Negative for difficulty urinating, hematuria and urgency.   Musculoskeletal: Positive for back pain. Negative for joint swelling.   Skin: Positive for pallor. Negative for color change.   Neurological: Positive for headaches. Negative for dizziness.   Psychiatric/Behavioral: Negative for behavioral problems and confusion. The patient is not nervous/anxious.    All other systems reviewed and are negative.      ACTIVE PROBLEMS:   Specialty Problems    None         PAST MEDICAL HISTORY:  Past Medical History:   Diagnosis Date   • Abnormal Pap smear of cervix Not sure been so long   • Acid reflux    • Anxiety    • COPD (chronic obstructive pulmonary disease) ([facility])    • Depression    • Fibromyalgia    • Hypertension 2016    Just  at times   • Kidney stone 1982    Passed   • Panic attack    • Rapid heart beat    • Urinary tract infection Not sure of first time happens some not as much as use to       SURGICAL HISTORY:  Past Surgical History:   Procedure Laterality Date   • CHOLECYSTECTOMY     • TUBAL ABDOMINAL LIGATION         FAMILY HISTORY:  Family History   Problem Relation Age of Onset   • Heart disease Father    • Hypertension Father    • Anxiety disorder Mother    • Depression Mother    • Anesthesia problems Mother    • Cancer Mother    • Heart disease Mother    • Hypertension Mother    • Anxiety disorder Sister    • Depression Sister    • Suicide Attempts Sister        SOCIAL HISTORY:  Social History     Tobacco Use   • Smoking status: Never Smoker   • Smokeless tobacco: Never Used    Substance Use Topics   • Alcohol use: No       CURRENT MEDICATION:    Current Outpatient Medications:   •  atenolol (TENORMIN) 25 MG tablet, Take 25 mg by mouth 2 (Two) Times a Day., Disp: , Rfl:   •  ATROVENT HFA 17 MCG/ACT inhaler, Inhale 2 puffs 4 (Four) Times a Day., Disp: , Rfl:   •  busPIRone (BUSPAR) 30 MG tablet, Take 1 tablet by mouth 2 (Two) Times a Day., Disp: 180 tablet, Rfl: 0  •  furosemide (LASIX) 20 MG tablet, Take 20 mg by mouth 2 (Two) Times a Day As Needed., Disp: , Rfl:   •  hydroxychloroquine (PLAQUENIL) 200 MG tablet, Take 200 mg by mouth 2 (Two) Times a Day., Disp: , Rfl:   •  levocetirizine (XYZAL) 5 MG tablet, Take 5 mg by mouth Daily., Disp: , Rfl:   •  montelukast (SINGULAIR) 10 MG tablet, Take 1 tablet by mouth Daily., Disp: , Rfl:   •  omeprazole (priLOSEC) 40 MG capsule, Take 40 mg by mouth Daily., Disp: , Rfl:   •  phenazopyridine (Pyridium) 200 MG tablet, Take 1 tablet by mouth 3 (Three) Times a Day As Needed for Bladder Spasms., Disp: 20 tablet, Rfl: 0  •  potassium chloride (KLOR-CON) 20 MEQ packet, Take 20 mEq by mouth Daily As Needed., Disp: , Rfl:   •  pregabalin (LYRICA) 25 MG capsule, Take 1 capsule by mouth Every 8 (Eight) Hours., Disp: , Rfl:   •  tiZANidine (ZANAFLEX) 4 MG tablet, Take 4 mg by mouth At Night As Needed for Muscle Spasms., Disp: , Rfl:   •  venlafaxine XR (EFFEXOR-XR) 150 MG 24 hr capsule, TAKE 1 CAPSULE EVERY DAY ALONG WITH 75MG FOR TOTAL 225MG, Disp: 90 capsule, Rfl: 0  •  venlafaxine XR (EFFEXOR-XR) 75 MG 24 hr capsule, Take 1 daily along with the effexor 150mg for total of 225mg, Disp: 90 capsule, Rfl: 0  •  ondansetron (Zofran) 4 MG tablet, Take 1 tablet by mouth Every 12 (Twelve) Hours As Needed for Nausea., Disp: 20 tablet, Rfl: 0  No current facility-administered medications for this visit.    ALLERGIES:  Aspirin, Biaxin [clarithromycin], Celexa [citalopram], Ciprofloxacin, Flexeril [cyclobenzaprine], Lexapro [escitalopram], Morphine and related,  "Motrin [ibuprofen], Neuromuscular blocking agents, Penicillins, Prozac [fluoxetine hcl], Sulfa antibiotics, Thorazine [chlorpromazine], Unasyn [ampicillin-sulbactam sodium], and Zoloft [sertraline hcl]    VISIT VITALS:  Ht 167.6 cm (66\")   Wt 68.9 kg (152 lb)   BMI 24.53 kg/m²     Physical Exam  Constitutional:       General: She is in acute distress.      Appearance: She is well-developed.   HENT:      Head: Normocephalic and atraumatic.   Eyes:      Pupils: Pupils are equal, round, and reactive to light.   Neck:      Thyroid: No thyromegaly.      Trachea: No tracheal deviation.   Cardiovascular:      Rate and Rhythm: Normal rate and regular rhythm.      Heart sounds: No murmur heard.  Pulmonary:      Effort: Pulmonary effort is normal. No respiratory distress.      Breath sounds: Normal breath sounds. No stridor. No wheezing.   Abdominal:      General: Bowel sounds are normal. There is distension.      Palpations: Abdomen is soft.      Tenderness: There is abdominal tenderness.   Genitourinary:     Labia:         Right: No tenderness.         Left: No tenderness.       Vagina: Normal. No vaginal discharge.      Comments: Soft nontender abdomen with no organomegaly, rigidity, guarding or tenderness.  Normal vaginal orifice.  She has  no leakage with Valsalva.  No significant perineal body abnormalities and a normal external anus.     Musculoskeletal:         General: Tenderness present. No deformity. Normal range of motion.      Cervical back: Normal range of motion.   Skin:     General: Skin is warm and dry.      Capillary Refill: Capillary refill takes less than 2 seconds.      Coloration: Skin is not pale.      Findings: No erythema or rash.   Neurological:      Mental Status: She is alert and oriented to person, place, and time.      Cranial Nerves: No cranial nerve deficit.      Sensory: No sensory deficit.      Coordination: Coordination normal.   Psychiatric:         Behavior: Behavior normal.         " Thought Content: Thought content normal.         Judgment: Judgment normal.         Assessment & Plan      Diagnosis Plan   1. Interstitial cystitis  POC Urinalysis Dipstick, Automated    heparin (porcine) 10,000 Units, lidocaine (XYLOCAINE) 2% 10 mL, sodium bicarbonate 8.4 % 6 mL irrigation    ondansetron (Zofran) 4 MG tablet    phenazopyridine (Pyridium) 200 MG tablet   2. Acute cystitis with hematuria  heparin (porcine) 10,000 Units, lidocaine (XYLOCAINE) 2% 10 mL, sodium bicarbonate 8.4 % 6 mL irrigation    ondansetron (Zofran) 4 MG tablet    phenazopyridine (Pyridium) 200 MG tablet   3. Urinary tract infection without hematuria, site unspecified  Urine Culture - Urine, Urine, Clean Catch    phenazopyridine (Pyridium) 200 MG tablet   4. Bladder pain  ondansetron (Zofran) 4 MG tablet    phenazopyridine (Pyridium) 200 MG tablet   5. Interstitial cystitis (chronic) with hematuria  ondansetron (Zofran) 4 MG tablet    phenazopyridine (Pyridium) 200 MG tablet   6. Detrusor instability of bladder  phenazopyridine (Pyridium) 200 MG tablet   7. Urgency incontinence  phenazopyridine (Pyridium) 200 MG tablet              ASSESSMENT         INTERSTITIAL CYSTITIS/ ACUTE CYSTITIS /E'COLI UTI-RESOLVED  MS DEDRA ANDERSON is a pleasant 51 year old Very pleasant patient  Who returns  to clinic today for evaluation/bladder instillation, secondary to severe ongoing urinary symptoms of frequency, urgency, dysuria, lower back pain, urinary discomfort, with significant bladder pain and discomfort with urinary symptoms consistent with interstitial cystitis.she has been on antibiotic suppressive therapy with Keflex 250 mg daily, Her urine dipstick today however still show trace leukocyte esterase otherwise is completely negative for any nitrites, is negative for gross/microscopic hematuria.  Her PVR is 100 cc which we drained prior to bladder instillation.     Insterstitial Cystitis/Bladder Instillations.Patient tolerated her  instillation of 10CC of 2% lidocaine, Solu-Medrol and heparin well today without any discomfort. She is advised to hold it in for atleast two hours prior to voiding for maximum benefit.     AGAIN, We discussed IC and Patient was educated on the facts that  Interstitial cystitis is more of a syndrome than an actual disease. We talked about the fact that there is no imaging modality or pathological biopsy that is indicative of interstitial cystitis.  When we don't know the cause of the condition treatments for the condition are widely variable.      We talked about dietary changes that might be helpful to her and a handout was given to her. We discussed Symptoms of urgency, frequency, suprapubic discomfort, bladder spasms and dyspareunia in the absence of any documented infection as the classical description of interstitial cystitis.                                                     PLAN  We resent her urine for culture. I will call her with results if any bacteria growth not sensitive to her current therapy     Will Continue Keflex 250 mg PO Daily -Suppressive Therapy.      She has been encouraged to increase her p.o. fluid intake to at least 1 to 2 L daily and avoid bladder irritants such as caffeine products, spicy foods, and citrusy foods.      I recommend concomitant probiotics with treatment with antibiotics to protect the rectal reservoir including over-the-counter yogurt preparations to ally oral pills containing the appropriate probiotics. Patient reports the diligent use of Probiotics.     She is to also continue other medications for yeast vaginitis, atrophic vaginitis as prescribed above.     Will see her back in 1 months for bladder installations, in clinic and recheck her urinalysis at that time.    She may return sooner if need be     Patient is agreeable to plan of care.    Return in about 1 month (around 8/20/2022) for Next scheduled follow up, FOR IC WITH RECURRENT UTI/DYSURIA/DETRUSSOR  INSTABILITY-BLADDER INSTALLATN.    BMI is >= 25 and <30. (Overweight) The following options were offered after discussion;: weight loss educational material (shared in after visit summary), exercise counseling/recommendations and nutrition counseling/recommendations            Griselda Cheng-Akwa, APRN

## 2022-07-21 LAB — BACTERIA SPEC AEROBE CULT: NO GROWTH

## 2022-07-25 ENCOUNTER — TELEPHONE (OUTPATIENT)
Dept: UROLOGY | Facility: CLINIC | Age: 52
End: 2022-07-25

## 2022-07-27 ENCOUNTER — OFFICE VISIT (OUTPATIENT)
Dept: PSYCHIATRY | Facility: CLINIC | Age: 52
End: 2022-07-27

## 2022-07-27 DIAGNOSIS — F41.1 GENERALIZED ANXIETY DISORDER: Primary | ICD-10-CM

## 2022-07-27 DIAGNOSIS — F33.1 MAJOR DEPRESSIVE DISORDER, RECURRENT EPISODE, MODERATE: ICD-10-CM

## 2022-07-27 DIAGNOSIS — Z63.9 FAMILY DYNAMICS PROBLEM: ICD-10-CM

## 2022-07-27 PROCEDURE — 90832 PSYTX W PT 30 MINUTES: CPT | Performed by: SOCIAL WORKER

## 2022-07-27 SDOH — SOCIAL STABILITY - SOCIAL INSECURITY: PROBLEM RELATED TO PRIMARY SUPPORT GROUP, UNSPECIFIED: Z63.9

## 2022-08-11 NOTE — PROGRESS NOTES
Date of Service: July 27, 2022  Time In: 1:30 PM  Time Out: 2:05 PM      PROGRESS NOTE  Data:  Sangeeta Aguilar is a 51 y.o. female who met 1: 1 with the undersigned for a regularly scheduled individual outpatient psychotherapy session at the [facility] for follow-up of depression and anxiety.  The patient and the undersigned wore masks throughout the session and maintained appropriate distancing.       HPI: Patient reports she recently went to [place] to visit her 2 grandchildren who she has raised from birth and states although it is very difficult it appears as though they are not being taken in relative good care.  However, she states her concern is that their mother's boyfriend is primarily responsible for their care.  Patient states she also continues to be frustrated with their father, her son, his behavior.  Patient reports ongoing depression as evidenced by depressed mood, anhedonia, anergia, periods of hopelessness, difficulty concentrating, and periods of social isolation.  Patient rates current symptoms of depression at a 4 on a scale 1-10 with 10 being most severe.   Patient also continues to struggle with anxiety including anxious mood, feeling on edge, feeling overwhelmed, increased heart rate, muscle tension, and urge to escape the situation and a sense of impending doom.  Patient rates current symptoms of anxiety at a 6 on scale 1-10 with 10 being most severe.     Patient reports she continues to adhere to medication regimen as prescribed.  Patient adamantly and convincingly denies suicidal ideation vehemently denies any substance use.      Clinical Maneuvering/Intervention:  Assisted patient in processing above session content; acknowledged and normalized patient’s thoughts, feelings, and concerns.  Allowed the patient to discuss/process her feelings and the difficulty of no longer being the primary caregiver for her grandchildren and validated her feelings.  Also assisted her in recognizing  that if their mother can be an appropriate caregiver that is likely where they should be and it will make it easier for her.  Continue to utilize cognitive behavioral therapy to assist the patient in developing appropriate coping mechanisms decrease in severity frequency if  symptoms.  Provided unconditional positive regard in a safe, supportive environment.      Allowed patient to freely discuss issues without interruption or judgment. Provided safe, confidential environment to facilitate the development of positive therapeutic relationship and encourage open, honest communication. Assisted patient in identifying risk factors which would indicate the need for higher level of care including thoughts to harm self or others and/or self-harming behavior and encouraged patient to contact this office, call 911, or present to the nearest emergency room should any of these events occur. Discussed crisis intervention services and means to access.  Patient adamantly and convincingly denies current suicidal or homicidal ideation or perceptual disturbance.    Assessment    Patient continues to struggle with  depression and anxiety which continue to be exacerbated by her grandchildren recently moving back in with her mother in [place] which has caused the patient to feel a sense of remorse and guilt.  As a result, the patient can be reasonably expected to continue to benefit from treatment and would likely be at increased risk for decompensation.    Diagnoses and all orders for this visit:    1. Generalized anxiety disorder (Primary)    2. Major depressive disorder, recurrent episode, moderate (HCC)    3. Family dynamics problem               Mental Status Exam  Hygiene:  good  Dress:  casual  Attitude:  Cooperative  Motor Activity:  Appropriate  Speech:  Normal  Mood:  depressed  Affect:  depressed  Thought Processes:  Linear  Thought Content:  normal  Suicidal Thoughts:  denies  Homicidal Thoughts:  denies  Crisis Safety  Plan: yes, to come to the emergency room.  Hallucinations:  denies    Patient's Support Network Includes:  extended family    Progress toward goal: Not at goal    Functional Status: Moderate impairment     Prognosis: Fair with Ongoing Treatment     Plan         Patient will continue in outpatient therapy session at [facility] in approximately 4 weeks.  Patient will continue in pharmacotherapy as scheduled with YURIY Morton.    Patient will adhere to medication regimen as prescribed and report any side effects. Patient will contact this office, call 911 or present to the nearest emergency room should suicidal or homicidal ideations occur. Provide Cognitive Behavioral Therapy and Integrative Therapy to improve functioning, maintain stability, and avoid decompensation and the need for higher level of care.          Return in about 3 weeks (around 8/17/2022) for Next scheduled follow up.      This document signed by Jovany Ellis LCSW, ZAHIDA August 11, 2022 07:02 EDT

## 2022-09-08 ENCOUNTER — OFFICE VISIT (OUTPATIENT)
Dept: PSYCHIATRY | Facility: CLINIC | Age: 52
End: 2022-09-08

## 2022-09-08 VITALS
HEART RATE: 75 BPM | SYSTOLIC BLOOD PRESSURE: 138 MMHG | WEIGHT: 154.2 LBS | TEMPERATURE: 96.9 F | BODY MASS INDEX: 24.78 KG/M2 | OXYGEN SATURATION: 98 % | HEIGHT: 66 IN | DIASTOLIC BLOOD PRESSURE: 88 MMHG

## 2022-09-08 DIAGNOSIS — F33.1 MAJOR DEPRESSIVE DISORDER, RECURRENT EPISODE, MODERATE: ICD-10-CM

## 2022-09-08 DIAGNOSIS — F41.1 GENERALIZED ANXIETY DISORDER: ICD-10-CM

## 2022-09-08 PROCEDURE — 99214 OFFICE O/P EST MOD 30 MIN: CPT | Performed by: NURSE PRACTITIONER

## 2022-09-08 RX ORDER — BUSPIRONE HYDROCHLORIDE 30 MG/1
30 TABLET ORAL 2 TIMES DAILY
Qty: 180 TABLET | Refills: 0 | Status: SHIPPED | OUTPATIENT
Start: 2022-09-08 | End: 2022-11-23 | Stop reason: SDUPTHER

## 2022-09-08 RX ORDER — VENLAFAXINE HYDROCHLORIDE 75 MG/1
CAPSULE, EXTENDED RELEASE ORAL
Qty: 90 CAPSULE | Refills: 0 | Status: SHIPPED | OUTPATIENT
Start: 2022-09-08 | End: 2022-11-23 | Stop reason: SDUPTHER

## 2022-09-08 RX ORDER — VENLAFAXINE HYDROCHLORIDE 150 MG/1
CAPSULE, EXTENDED RELEASE ORAL
Qty: 90 CAPSULE | Refills: 0 | Status: SHIPPED | OUTPATIENT
Start: 2022-09-08 | End: 2022-11-23 | Stop reason: SDUPTHER

## 2022-09-08 NOTE — PROGRESS NOTES
Subjective   Sangeeta Aguilar is a 52 y.o. female is here today for medication management follow-up.    Chief Complaint: recheck on depression    History of Present Illness:  Pt presents for follow up at the [facility].  She continues to have custody of her grandchildren but said they wanted to go back to [place] with their mom and she let them go.  She is not sure about this decision.  Continues to have relationship issues with her son who has history of drug abuse and is currently homeless.  He wanted to move in with her and she would not allow it.  She worries about him and her grandchildren.  Says this leads to some more depression.  She does not feel a medication adjustment is needed.  Wants to give it some more time.  Denies any SI.  No AVH.  Anxiety is always there.  sleeping well.  No medical stressors.  Body mass index is 24.9 kg/m². no appetite changes.            The following portions of the patient's history were reviewed and updated as appropriate: allergies, current medications, past family history, past medical history, past social history, past surgical history and problem list.    Review of Systems   Constitutional: Negative for activity change, appetite change and fatigue.   Eyes: Negative for visual disturbance.   Respiratory: Positive for cough.    Cardiovascular: Negative.    Gastrointestinal: Negative for nausea.   Endocrine: Negative.    Genitourinary: Negative.    Musculoskeletal: Positive for back pain. Negative for arthralgias.   Skin: Negative.    Allergic/Immunologic: Negative.    Neurological: Positive for headaches. Negative for dizziness and seizures.   Hematological: Negative.    Psychiatric/Behavioral: Negative for agitation, behavioral problems, confusion, decreased concentration, dysphoric mood, hallucinations, self-injury, sleep disturbance and suicidal ideas. The patient is not nervous/anxious and is not hyperactive.      Reviewed copied data and there are no  "changes    Objective   Physical Exam   Constitutional: She is oriented to person, place, and time. She appears well-developed.   Neurological: She is alert and oriented to person, place, and time.   Psychiatric: Her speech is normal and behavior is normal. Judgment and thought content normal.   Pleasant and cooperative   Vitals reviewed.    Blood pressure 138/88, pulse 75, temperature 96.9 °F (36.1 °C), height 167.6 cm (65.98\"), weight 69.9 kg (154 lb 3.2 oz), SpO2 98 %.    Medication List:   Current Outpatient Medications   Medication Sig Dispense Refill   • busPIRone (BUSPAR) 30 MG tablet Take 1 tablet by mouth 2 (Two) Times a Day. 180 tablet 0   • venlafaxine XR (EFFEXOR-XR) 150 MG 24 hr capsule TAKE 1 CAPSULE EVERY DAY ALONG WITH 75MG FOR TOTAL 225MG 90 capsule 0   • venlafaxine XR (EFFEXOR-XR) 75 MG 24 hr capsule Take 1 daily along with the effexor 150mg for total of 225mg 90 capsule 0   • atenolol (TENORMIN) 25 MG tablet Take 25 mg by mouth 2 (Two) Times a Day.     • ATROVENT HFA 17 MCG/ACT inhaler Inhale 2 puffs 4 (Four) Times a Day.     • furosemide (LASIX) 20 MG tablet Take 20 mg by mouth 2 (Two) Times a Day As Needed.     • hydroxychloroquine (PLAQUENIL) 200 MG tablet Take 200 mg by mouth 2 (Two) Times a Day.     • levocetirizine (XYZAL) 5 MG tablet Take 5 mg by mouth Daily.     • montelukast (SINGULAIR) 10 MG tablet Take 1 tablet by mouth Daily.     • omeprazole (priLOSEC) 40 MG capsule Take 40 mg by mouth Daily.     • ondansetron (Zofran) 4 MG tablet Take 1 tablet by mouth Every 12 (Twelve) Hours As Needed for Nausea. 20 tablet 0   • phenazopyridine (Pyridium) 200 MG tablet Take 1 tablet by mouth 3 (Three) Times a Day As Needed for Bladder Spasms. 20 tablet 0   • potassium chloride (KLOR-CON) 20 MEQ packet Take 20 mEq by mouth Daily As Needed.     • pregabalin (LYRICA) 25 MG capsule Take 1 capsule by mouth Every 8 (Eight) Hours.     • tiZANidine (ZANAFLEX) 4 MG tablet Take 4 mg by mouth At Night As " Needed for Muscle Spasms.       No current facility-administered medications for this visit.     Reviewed copied data and there are no changes  '  Mental Status Exam:   Hygiene:   good  Cooperation:  Cooperative  Eye Contact:  Good  Psychomotor Behavior:  Appropriate  Affect:  Full range  Hopelessness: Denies  Speech:  Normal  Thought Process:  Goal directed  Thought Content:  Normal  Suicidal:  None  Homicidal:  None  Hallucinations:  None  Delusion:  None  Memory:  Intact  Orientation:  Person, Place, Time and Situation  Reliability:  good  Insight:  Good  Judgement:  Good  Impulse Control:  Good  Physical/Medical Issues:  No     Assessment & Plan   Problems Addressed this Visit    None     Visit Diagnoses     Major depressive disorder, recurrent episode, moderate (HCC)        Relevant Medications    venlafaxine XR (EFFEXOR-XR) 75 MG 24 hr capsule    venlafaxine XR (EFFEXOR-XR) 150 MG 24 hr capsule    busPIRone (BUSPAR) 30 MG tablet    Generalized anxiety disorder        Relevant Medications    venlafaxine XR (EFFEXOR-XR) 75 MG 24 hr capsule    venlafaxine XR (EFFEXOR-XR) 150 MG 24 hr capsule    busPIRone (BUSPAR) 30 MG tablet      Diagnoses       Codes Comments    Major depressive disorder, recurrent episode, moderate (HCC)     ICD-10-CM: F33.1  ICD-9-CM: 296.32     Generalized anxiety disorder     ICD-10-CM: F41.1  ICD-9-CM: 300.02           Functionality: pt having moderate impairment in important areas of daily functioning.  Prognosis:  Good dependent on medication/follow up and treatment plan compliance.  She is currently pleased with medication regimen.   She is going to continue the buspar for anxiety and the Effexor for the anxiety/depression.  Refills have been submitted.  She is to continue therapy with Jovany Ellis.  I am going to have her return to clinic in 2 months instead of 6 months to see how things are going and to see if any medication changes are needed at that time.  She will contact me sooner  if needed.  Continuing efforts to promote the therapeutic alliance, address the patient's issues, and strengthen self awareness, insights, and coping skills.    Patient is agreeable to call the  Clinic.  Patient is aware to call 911 or go to the nearest ER should begin having SI/HI.           Answers for HPI/ROS submitted by the patient on 9/8/2022  Please describe your symptoms.: Depression anxiety alot of depressed days .  Have you had these symptoms before?: Yes  How long have you been having these symptoms?: Greater than 2 weeks  Please list any medications you are currently taking for this condition.: Venlafaxine 75 mg. And 150 mg buspirone  30 mg  Please describe any probable cause for these symptoms. : Stress life missing grandkids  What is the primary reason for your visit?: Other

## 2022-09-23 ENCOUNTER — OFFICE VISIT (OUTPATIENT)
Dept: UROLOGY | Facility: CLINIC | Age: 52
End: 2022-09-23

## 2022-09-23 VITALS — HEIGHT: 66 IN | BODY MASS INDEX: 23.3 KG/M2 | WEIGHT: 145 LBS

## 2022-09-23 DIAGNOSIS — N34.3 DYSURIA-FREQUENCY SYNDROME: ICD-10-CM

## 2022-09-23 DIAGNOSIS — N39.0 URINARY TRACT INFECTION WITHOUT HEMATURIA, SITE UNSPECIFIED: ICD-10-CM

## 2022-09-23 DIAGNOSIS — N32.81 DETRUSOR INSTABILITY OF BLADDER: ICD-10-CM

## 2022-09-23 DIAGNOSIS — N30.01 ACUTE CYSTITIS WITH HEMATURIA: ICD-10-CM

## 2022-09-23 DIAGNOSIS — N39.41 URGENCY INCONTINENCE: ICD-10-CM

## 2022-09-23 DIAGNOSIS — N30.11 INTERSTITIAL CYSTITIS (CHRONIC) WITH HEMATURIA: ICD-10-CM

## 2022-09-23 DIAGNOSIS — N30.10 INTERSTITIAL CYSTITIS: Primary | ICD-10-CM

## 2022-09-23 DIAGNOSIS — R39.89 BLADDER PAIN: ICD-10-CM

## 2022-09-23 LAB
BILIRUB BLD-MCNC: ABNORMAL MG/DL
CLARITY, POC: CLEAR
COLOR UR: YELLOW
EXPIRATION DATE: ABNORMAL
GLUCOSE UR STRIP-MCNC: NEGATIVE MG/DL
KETONES UR QL: NEGATIVE
LEUKOCYTE EST, POC: ABNORMAL
Lab: ABNORMAL
NITRITE UR-MCNC: NEGATIVE MG/ML
PH UR: 6.5 [PH] (ref 5–8)
PROT UR STRIP-MCNC: ABNORMAL MG/DL
RBC # UR STRIP: NEGATIVE /UL
SP GR UR: 1.03 (ref 1–1.03)
UROBILINOGEN UR QL: NORMAL

## 2022-09-23 PROCEDURE — 51700 IRRIGATION OF BLADDER: CPT | Performed by: NURSE PRACTITIONER

## 2022-09-23 PROCEDURE — 87086 URINE CULTURE/COLONY COUNT: CPT | Performed by: NURSE PRACTITIONER

## 2022-09-23 PROCEDURE — 81003 URINALYSIS AUTO W/O SCOPE: CPT | Performed by: NURSE PRACTITIONER

## 2022-09-23 NOTE — PROGRESS NOTES
"Chief Complaint  Cystitis (MONTHLY FOLLOW UP FOR BLADDER INSTILLATION)    Subjective          Dedra Aguilar presents to [facility] for IC/BLADDER INSTILLATION/ACUTE CYSTITIS/UTI  History of Present Illness    MS DEDRA AGUILAR is a pleasant 52 year old patient  Who returns  to clinic today for evaluation/bladder instillation, secondary to severe ongoing urinary symptoms of frequency, urgency, dysuria, lower back pain, urinary discomfort, with significant bladder pain and discomfort with urinary symptoms consistent with interstitial cystitis.     She is here today to continue monthly therapy with bladder installations(Solu-Medrol, heparin, lidocaine) for her cystitis.  Patient tolerated procedure well last month without any discomfort. Patient also reports significant improvement in her prior UTI symptoms since starting antibiotic therapy for her last E. coli positive UTI. She is currently on suppressive therapy with Keflex 250 mg daily.  Patient denies any side effects from medications.     Today 09/23/2022, she Reports significant improvement in her urinary symptoms  of frequency, urgency, and dysuria.  She however still reports burning with urination, flank pain, pelvic pressure and suprapubic discomfort, lower back pain, consistent with her prior UTIs.  Her urine dipstick today showed 3+ leukocyte esterase, negative nitrates, she has 2+ proteinuria, and 1+ bilirubin.  Nevertheless, her last urine culture on 06/20/2022 was NEGATIVE for UTI.     She has a significant PMHx as noted in chart     Objective   Vital Signs:   Ht 167.6 cm (66\")   Wt 65.8 kg (145 lb)   BMI 23.40 kg/m²       ROS:   Review of Systems   Constitutional: Positive for fatigue. Negative for activity change, appetite change, chills, diaphoresis, fever, unexpected weight gain and unexpected weight loss.   HENT: Negative for congestion, ear discharge, ear pain, nosebleeds, rhinorrhea, sinus pressure " and sore throat.    Eyes: Negative for blurred vision, double vision, photophobia, pain, redness and visual disturbance.   Respiratory: Negative for apnea, cough, chest tightness, shortness of breath, wheezing and stridor.    Cardiovascular: Negative for chest pain, palpitations and leg swelling.   Gastrointestinal: Positive for nausea. Negative for abdominal distention, abdominal pain, constipation, diarrhea and vomiting.   Endocrine: Negative for polydipsia, polyphagia and polyuria.   Genitourinary: Positive for difficulty urinating, dysuria, flank pain, frequency, pelvic pain, pelvic pressure and urgency. Negative for decreased urine volume, dyspareunia, genital sores, hematuria, urinary incontinence and vaginal discharge.   Musculoskeletal: Positive for back pain and myalgias. Negative for arthralgias and joint swelling.   Skin: Positive for rash. Negative for pallor and wound.   Neurological: Positive for weakness. Negative for dizziness, tremors, syncope, light-headedness, headache, memory problem and confusion.   Psychiatric/Behavioral: Positive for sleep disturbance and stress. Negative for behavioral problems and decreased concentration.        Physical Exam  Constitutional:       General: She is in acute distress.      Appearance: She is well-developed. She is ill-appearing.   HENT:      Head: Normocephalic and atraumatic.   Eyes:      Pupils: Pupils are equal, round, and reactive to light.   Neck:      Thyroid: No thyromegaly.      Trachea: No tracheal deviation.   Cardiovascular:      Rate and Rhythm: Normal rate and regular rhythm.      Heart sounds: No murmur heard.  Pulmonary:      Effort: Pulmonary effort is normal. No respiratory distress.      Breath sounds: Normal breath sounds. No stridor. No wheezing.   Abdominal:      General: Bowel sounds are normal.      Palpations: Abdomen is soft.      Tenderness: There is abdominal tenderness.   Genitourinary:     Labia:         Right: No tenderness.          Left: No tenderness.       Vagina: Normal. No vaginal discharge.      Comments: Reports dysuria, urine frequency urgency  Musculoskeletal:         General: Tenderness present. No deformity. Normal range of motion.      Cervical back: Normal range of motion.   Skin:     General: Skin is warm and dry.      Capillary Refill: Capillary refill takes less than 2 seconds.      Coloration: Skin is not pale.      Findings: No erythema or rash.   Neurological:      Mental Status: She is alert and oriented to person, place, and time.      Cranial Nerves: No cranial nerve deficit.      Sensory: No sensory deficit.      Coordination: Coordination normal.   Psychiatric:         Behavior: Behavior normal.         Thought Content: Thought content normal.         Judgment: Judgment normal.        Result Review :     UA    Urinalysis 6/20/22 7/20/22 9/23/22   Ketones, UA Negative Negative Negative   Leukocytes, UA Negative Small (1+) (A) 500 Home/ul (A)   (A) Abnormal value            Urine Culture    Urine Culture 6/20/22 7/20/22 9/23/22   Urine Culture No growth No growth No growth                Assessment and Plan    Problem List Items Addressed This Visit    None     Visit Diagnoses     Interstitial cystitis    -  Primary    Relevant Medications    ondansetron (Zofran) 4 MG tablet    phenazopyridine (Pyridium) 200 MG tablet    Urinary tract infection without hematuria, site unspecified        Relevant Medications    heparin (porcine) 10,000 Units, sodium bicarbonate 4.2 % 2.5 mEq, lidocaine (XYLOCAINE) 2% 10 mL irrigation (Completed)    cephalexin (KEFLEX) 250 MG capsule    phenazopyridine (Pyridium) 200 MG tablet    Other Relevant Orders    POC Urinalysis Dipstick, Automated (Completed)    Urine Culture - Urine, Urine, Clean Catch (Completed)    Dysuria-frequency syndrome        Relevant Medications    phenazopyridine (Pyridium) 200 MG tablet    Acute cystitis with hematuria        Relevant Medications    cephalexin (KEFLEX)  250 MG capsule    ondansetron (Zofran) 4 MG tablet    phenazopyridine (Pyridium) 200 MG tablet    Bladder pain        Relevant Medications    ondansetron (Zofran) 4 MG tablet    phenazopyridine (Pyridium) 200 MG tablet    Interstitial cystitis (chronic) with hematuria        Relevant Medications    ondansetron (Zofran) 4 MG tablet    phenazopyridine (Pyridium) 200 MG tablet    Detrusor instability of bladder        Relevant Medications    phenazopyridine (Pyridium) 200 MG tablet    Urgency incontinence        Relevant Medications    phenazopyridine (Pyridium) 200 MG tablet          Patient reports that she is not currently experiencing any symptoms of urinary incontinence.      BMI is within normal parameters. No other follow-up for BMI required.      RADIOLOGY (CT AND/OR KUB):    CT Abdomen and Pelvis: No results found for this or any previous visit.     CT Stone Protocol: No results found for this or any previous visit.     KUB: No results found for this or any previous visit.       LABS (3 MONTHS):    Office Visit on 09/23/2022   Component Date Value Ref Range Status   • Color 09/23/2022 Yellow  Yellow, Straw, Dark Yellow, Luiza Final   • Clarity, UA 09/23/2022 Clear  Clear Final   • Specific Gravity  09/23/2022 1.030  1.005 - 1.030 Final   • pH, Urine 09/23/2022 6.5  5.0 - 8.0 Final   • Leukocytes 09/23/2022 500 Home/ul (A) Negative Final   • Nitrite, UA 09/23/2022 Negative  Negative Final   • Protein, POC 09/23/2022 2+ (A) Negative mg/dL Final   • Glucose, UA 09/23/2022 Negative  Negative mg/dL Final   • Ketones, UA 09/23/2022 Negative  Negative Final   • Urobilinogen, UA 09/23/2022 Normal  Normal, 0.2 E.U./dL Final   • Bilirubin 09/23/2022 1 mg/dL (A) Negative Final   • Blood, UA 09/23/2022 Negative  Negative Final   • Lot Number 09/23/2022 [id number]   Final   • Expiration Date 09/23/2022 122,123   Final   • Urine Culture 09/23/2022 No growth   Final   Office Visit on 07/20/2022   Component Date Value Ref  Range Status   • Color 07/20/2022 Yellow  Yellow, Straw, Dark Yellow, Luiza Final   • Clarity, UA 07/20/2022 Clear  Clear Final   • Specific Gravity  07/20/2022 1.030  1.005 - 1.030 Final   • pH, Urine 07/20/2022 5.0  5.0 - 8.0 Final   • Leukocytes 07/20/2022 Small (1+) (A) Negative Final   • Nitrite, UA 07/20/2022 Negative  Negative Final   • Protein, POC 07/20/2022 1+ (A) Negative mg/dL Final   • Glucose, UA 07/20/2022 Negative  Negative mg/dL Final   • Ketones, UA 07/20/2022 Negative  Negative Final   • Urobilinogen, UA 07/20/2022 Normal  Normal Final   • Bilirubin 07/20/2022 Negative  Negative Final   • Blood, UA 07/20/2022 Negative  Negative Final   • Lot Number 07/20/2022 [id number]   Final   • Expiration Date 07/20/2022 122,123   Final   • Urine Culture 07/20/2022 No growth   Final                                                             ASSESSMENT         INTERSTITIAL CYSTITIS/ ACUTE CYSTITIS /E'COLI UTI-RESOLVED  MS DEDRA ANDERSON is a pleasant 52 year old  patient  Who returns  to clinic today for her monthly evaluation/bladder instillation, secondary to severe ongoing urinary symptoms of frequency, urgency, dysuria, lower back pain, urinary discomfort, with significant bladder pain and discomfort with urinary symptoms consistent with interstitial cystitis.she has been on antibiotic suppressive therapy with Keflex 250 mg daily     Insterstitial Cystitis/Bladder Instillations.Patient tolerated her instillation of 10CC of 2% lidocaine, Solu-Medrol and heparin well today without any discomfort. She is advised to hold it in for atleast two hours prior to voiding for maximum benefit.     AGAIN, We discussed IC and Patient was educated on the facts that  Interstitial cystitis is more of a syndrome than an actual disease. We talked about the fact that there is no imaging modality or pathological biopsy that is indicative of interstitial cystitis.  When we don't know the cause of the condition treatments  for the condition are widely variable.      We talked about dietary changes that might be helpful to her and a handout was given to her. We discussed Symptoms of urgency, frequency, suprapubic discomfort, bladder spasms and dyspareunia in the absence of any documented infection as the classical description of interstitial cystitis.                                                     PLAN  We resent her urine for culture. I will call her with results if any bacteria growth not sensitive to her current therapy     Will Continue Keflex 250 mg PO Daily -Suppressive Therapy.      She has been encouraged to increase her p.o. fluid intake to at least 1 to 2 L daily and avoid bladder irritants such as caffeine products, spicy foods, and citrusy foods.      I recommend concomitant probiotics with treatment with antibiotics to protect the rectal reservoir including over-the-counter yogurt preparations to ally oral pills containing the appropriate probiotics. Patient reports the diligent use of Probiotics.     She is to also continue other medications for yeast vaginitis, atrophic vaginitis as prescribed above.     Will see her back in 1 months for bladder installations, in clinic and recheck her urinalysis at that time.     She may return sooner if need be     Patient is agreeable to plan of care.     Return in about 1 month (around 8/20/2022) for Next scheduled follow up, FOR IC WITH RECURRENT UTI/DYSURIA/DETRUSSOR INSTABILITY-BLADDER INSTALLATN.     BMI is >= 25 and <30. (Overweight) The following options were offered after discussion;: weight loss educational material (shared in after visit summary), exercise counseling/recommendations and nutrition counseling/recommendations     Smoking Cessation Counseling:  Never a smoker.  Patient does not currently use any tobacco products.     Follow Up   Return in about 6 weeks (around 11/4/2022) for Next scheduled follow up, RECURRENT UTI/DYSURIA/DETRUSSOR INSTABILITY/IC-BLADDER  INSTILLATION.    Patient was given instructions and counseling regarding her condition or for health maintenance advice. Please see specific information pulled into the AVS if appropriate.          This document has been electronically signed by Griselda Cheng-Akwa, APRN   September 26, 2022 22:53 EDT      Dictated Utilizing Dragon Dictation: Part of this note may be an electronic transcription/translation of spoken language to printed text using the Dragon Dictation System.

## 2022-09-24 LAB — BACTERIA SPEC AEROBE CULT: NO GROWTH

## 2022-09-26 RX ORDER — PHENAZOPYRIDINE HYDROCHLORIDE 200 MG/1
200 TABLET, FILM COATED ORAL 3 TIMES DAILY PRN
Qty: 20 TABLET | Refills: 0 | Status: SHIPPED | OUTPATIENT
Start: 2022-09-26

## 2022-09-26 RX ORDER — CEPHALEXIN 250 MG/1
250 CAPSULE ORAL 4 TIMES DAILY
Qty: 40 CAPSULE | Refills: 0 | Status: SHIPPED | OUTPATIENT
Start: 2022-09-26 | End: 2022-10-06

## 2022-09-26 RX ORDER — ONDANSETRON 4 MG/1
4 TABLET, FILM COATED ORAL EVERY 12 HOURS PRN
Qty: 20 TABLET | Refills: 0 | Status: SHIPPED | OUTPATIENT
Start: 2022-09-26

## 2022-10-05 ENCOUNTER — TELEPHONE (OUTPATIENT)
Dept: UROLOGY | Facility: CLINIC | Age: 52
End: 2022-10-05

## 2022-10-05 NOTE — TELEPHONE ENCOUNTER
Patient called wanting to know if she is suppose to take the antibotic once at night or four times a day, last visit [facility] didn't say anything about increasing the antibotic but sent it in saying take it four times a day, so she just wants clarification it is suppose top be took 4 times a day or once

## 2022-11-23 DIAGNOSIS — F41.1 GENERALIZED ANXIETY DISORDER: ICD-10-CM

## 2022-11-23 DIAGNOSIS — F33.1 MAJOR DEPRESSIVE DISORDER, RECURRENT EPISODE, MODERATE: ICD-10-CM

## 2022-11-23 RX ORDER — BUSPIRONE HYDROCHLORIDE 30 MG/1
30 TABLET ORAL 2 TIMES DAILY
Qty: 180 TABLET | Refills: 0 | Status: SHIPPED | OUTPATIENT
Start: 2022-11-23

## 2022-11-23 RX ORDER — VENLAFAXINE HYDROCHLORIDE 75 MG/1
CAPSULE, EXTENDED RELEASE ORAL
Qty: 90 CAPSULE | Refills: 0 | Status: SHIPPED | OUTPATIENT
Start: 2022-11-23

## 2022-11-23 RX ORDER — VENLAFAXINE HYDROCHLORIDE 150 MG/1
CAPSULE, EXTENDED RELEASE ORAL
Qty: 90 CAPSULE | Refills: 0 | Status: SHIPPED | OUTPATIENT
Start: 2022-11-23

## 2023-02-22 ENCOUNTER — TELEPHONE (OUTPATIENT)
Dept: PSYCHIATRY | Facility: CLINIC | Age: 53
End: 2023-02-22
Payer: MEDICARE